# Patient Record
Sex: FEMALE | Race: WHITE | Employment: OTHER | URBAN - METROPOLITAN AREA
[De-identification: names, ages, dates, MRNs, and addresses within clinical notes are randomized per-mention and may not be internally consistent; named-entity substitution may affect disease eponyms.]

---

## 2019-10-10 ENCOUNTER — TRANSFERRED RECORDS (OUTPATIENT)
Dept: HEALTH INFORMATION MANAGEMENT | Facility: CLINIC | Age: 69
End: 2019-10-10

## 2019-10-14 ENCOUNTER — TRANSFERRED RECORDS (OUTPATIENT)
Dept: HEALTH INFORMATION MANAGEMENT | Facility: CLINIC | Age: 69
End: 2019-10-14

## 2020-01-13 ENCOUNTER — TRANSFERRED RECORDS (OUTPATIENT)
Dept: HEALTH INFORMATION MANAGEMENT | Facility: CLINIC | Age: 70
End: 2020-01-13

## 2020-02-25 ENCOUNTER — OFFICE VISIT (OUTPATIENT)
Dept: OBGYN | Facility: CLINIC | Age: 70
End: 2020-02-25
Attending: NURSE PRACTITIONER
Payer: COMMERCIAL

## 2020-02-25 ENCOUNTER — APPOINTMENT (OUTPATIENT)
Dept: LAB | Facility: CLINIC | Age: 70
End: 2020-02-25
Attending: NURSE PRACTITIONER
Payer: COMMERCIAL

## 2020-02-25 VITALS
DIASTOLIC BLOOD PRESSURE: 83 MMHG | WEIGHT: 117.2 LBS | SYSTOLIC BLOOD PRESSURE: 139 MMHG | BODY MASS INDEX: 20.01 KG/M2 | HEART RATE: 106 BPM | HEIGHT: 64 IN

## 2020-02-25 DIAGNOSIS — R06.83 SNORING: ICD-10-CM

## 2020-02-25 DIAGNOSIS — R79.89 ELEVATED PARATHYROID HORMONE: ICD-10-CM

## 2020-02-25 DIAGNOSIS — Z12.4 SCREENING FOR CERVICAL CANCER: ICD-10-CM

## 2020-02-25 DIAGNOSIS — R40.0 DAYTIME SLEEPINESS: ICD-10-CM

## 2020-02-25 DIAGNOSIS — Z76.89 ENCOUNTER TO ESTABLISH CARE: ICD-10-CM

## 2020-02-25 DIAGNOSIS — A63.0 GENITAL WARTS: ICD-10-CM

## 2020-02-25 DIAGNOSIS — Z00.00 ENCOUNTER FOR MEDICAL EXAMINATION TO ESTABLISH CARE: Primary | ICD-10-CM

## 2020-02-25 DIAGNOSIS — M85.80 OSTEOPENIA, UNSPECIFIED LOCATION: ICD-10-CM

## 2020-02-25 DIAGNOSIS — Z12.11 SPECIAL SCREENING FOR MALIGNANT NEOPLASMS, COLON: ICD-10-CM

## 2020-02-25 PROCEDURE — G0463 HOSPITAL OUTPT CLINIC VISIT: HCPCS

## 2020-02-25 PROCEDURE — G0476 HPV COMBO ASSAY CA SCREEN: HCPCS | Performed by: NURSE PRACTITIONER

## 2020-02-25 PROCEDURE — G0145 SCR C/V CYTO,THINLAYER,RESCR: HCPCS | Performed by: NURSE PRACTITIONER

## 2020-02-25 PROCEDURE — 87624 HPV HI-RISK TYP POOLED RSLT: CPT | Performed by: NURSE PRACTITIONER

## 2020-02-25 RX ORDER — ROSUVASTATIN CALCIUM 5 MG/1
5 TABLET, COATED ORAL DAILY
COMMUNITY
Start: 2020-02-07 | End: 2020-05-06

## 2020-02-25 RX ORDER — IMIQUIMOD 12.5 MG/.25G
CREAM TOPICAL
Qty: 8 PACKET | Refills: 3 | Status: SHIPPED | OUTPATIENT
Start: 2020-02-25 | End: 2021-08-04

## 2020-02-25 RX ORDER — METHYLPHENIDATE HYDROCHLORIDE 18 MG/1
TABLET ORAL 2 TIMES DAILY
COMMUNITY
Start: 2020-02-09

## 2020-02-25 RX ORDER — DESIPRAMINE HYDROCHLORIDE 50 MG/1
25 TABLET ORAL DAILY
COMMUNITY
Start: 2019-10-14 | End: 2021-11-01

## 2020-02-25 RX ORDER — METHYLPHENIDATE HYDROCHLORIDE 10 MG/1
TABLET ORAL
COMMUNITY
Start: 2020-02-07

## 2020-02-25 SDOH — HEALTH STABILITY: MENTAL HEALTH: HOW OFTEN DO YOU HAVE A DRINK CONTAINING ALCOHOL?: MONTHLY OR LESS

## 2020-02-25 ASSESSMENT — MIFFLIN-ST. JEOR: SCORE: 1028.68

## 2020-02-25 ASSESSMENT — PAIN SCALES - GENERAL: PAINLEVEL: NO PAIN (0)

## 2020-02-25 NOTE — LETTER
2/25/2020       RE: Nubia Lee  [address] [facility]  [place] [address]     Dear Colleague,    Thank you for referring your patient, Nubia Lee, to the [facility] at [facility]. Please see a copy of my visit note below.    Nubia Lee is a 70 yr old female who presents to clinic today to establish care for preventative health. She is not currently under the care of any healthcare providers. She had been seen at [facility] 1/13/2020 for problem-focused visit and had a physical exam in Fall 2019, and they recommended she establish care with a primary care provider for pap smear.     1. Breast cancer in 2000 of right breast s/p lumpectomy with axillary lump node dissection, chemo and radiation  - has been getting mammograms yearly at [facility], last in August 2019. Did have a breast ultrasound and mammogram with tomosynthesis 10/14/2019 due to right breast lump. Result: BI-RADS 2 benign. Plan to continue yearly mammograms.     2. Sleep apnea diagnosed at [facility] in Fall 2019. They recommended that she have this evaluated further by sleep clinic. Snoring, notices apnic spells that wake her, and feels tired during the day.    3. ADHD: Behavioral health services in [place], prescribes Concerta for ADHD and trintelix for depression.  Feels mental health is stable and in a good place now.      4. Preventative Health Screenings:  - Bone mineral density - see below  - Pap history - see below   - Colonoscopy: last done in 2005, normal; due now; open to colonoscopy  - Fasting glucose: WNL 10/10/2019   - Hepatitis C screening: negative 10/10/2019  - Lipids: WNL 1/13/2020  - TSH: WNL 10/10/2019    5. Elevated Parathyroid hormone & Calcium: Elevated in Fall 2019 at [place]. Has symptoms of fatigue and achy bones.     6. Hx of cervical HPV: Had colposcopy in 1988 due to abnormal cells. Last pap smear 2013 which was normal. Prior to 2013,  pap smears were sporadic.      7. Hx of Osteopenia: Osteoporosis diagnosed 10/2004 by DEXA scan.  Her lowest T-score was negative 3.7.Her most recent DEXA 10/2008 shows moderate osteopenia with lowest T score -1.9 in the R hip. Takes calcium and vitamin D supplement.     8. Hx of migraines: Takes exedrin migraine PRN for relief    Social History:     of prostate cancer which metastasized in 2017.  Delivered twins after IVF at age 49.  Soon after that breast cancer was diagnosed. She has a daughter with CP and her twin was diagnosed with leukemia at age 2, now doing very well. Daughters are now 20 yrs old. Daughter with CP lives with her. 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     Menopause: early 50's; no vaginal bleeding since that time. Occasional hot flashes, not bothersome. Has vaginal dryness, but not bothersome.     Sexual Health: not currently sexually active; no concerns today      Immunizations:  - Declined Flu vaccine  - Has not completed Shingrix vaccines     Past Medical History:   Diagnosis Date     Breast cancer (H)      Ectopic pregnancy      Osteopenia      Palpitations      Past Surgical History:   Procedure Laterality Date     LUMPECTOMY BREAST Right      History reviewed. No pertinent family history.  Current Outpatient Medications   Medication     AMINO ACIDS COMPLEX PO     aspirin-acetaminophen-caffeine (EXCEDRIN MIGRAINE) 250-250-65 MG tablet     Calcium Carb-Cholecalciferol (CALCIUM-VITAMIN D3) 500-400 MG-UNIT TABS     cholecalciferol (VITAMIN D) 200 units (5 mcg) TABS half-tab     desipramine (NORPRAMIN) 50 MG tablet     imiquimod (ALDARA) 5 % external cream     Magnesium Oxide 140 MG CAPS     methylphenidate (RITALIN) 10 MG tablet     methylphenidate HCl ER (CONCERTA) 18 MG CR tablet     Multiple Vitamins-Minerals (MULTIVITAMIN ADULT EXTRA C PO)     rosuvastatin (CRESTOR) 5 MG tablet     Ubiquinol 100 MG CAPS     vortioxetine (TRINTELLIX) 10 MG tablet     No  "current facility-administered medications for this visit.       No Known Allergies      OBJECTIVE:  /83   Pulse 106   Ht 1.613 m (5' 3.5\")   Wt 53.2 kg (117 lb 3.2 oz)   Breastfeeding No   BMI 20.44 kg/m    General: pleasant female in no acute distress  Psych: normal mentation, well oriented  Musculoskeletal: no gross deformities  Pelvic Exam:  Vulva: No external lesions, normal hair distribution, no adenopathy; genital warts present  Vagina: estrogen loss atrophy present; no abnormal discharge, minimal rugae, no lesions  Cervix: smooth, pink, no visible lesions; no CMT  Uterus: Normal size, anteverted, non-tender, mobile  Ovaries: No mass, non-tender, mobile  Rectal exam: normal anus    ASSESSMENT & PLAN:  (Z12.11) Special screening for malignant neoplasms, colon  (primary encounter diagnosis)  Plan: COLORECTAL SURGERY REFERRAL       Pt to schedule colonosocpy    (M85.80) Osteopenia, unspecified location  Plan: Dexa hip/pelvis/spine        Pt to schedule Dexa scan and then a follow-up visit with internal medicine to discuss results.      (E34.9) Elevated parathyroid hormone  Plan: Calcium, Parathyroid Hormone Intact,         Comprehensive metabolic panel (BMP + Alb, Alk         Phos, ALT, AST, Total. Bili, TP)    (R06.83) Snoring  Plan: SLEEP EVALUATION & MANAGEMENT REFERRAL - [facility]         [phone] (Age 15 and up)    (R40.0) Daytime sleepiness  Plan: SLEEP EVALUATION & MANAGEMENT REFERRAL - [facility]         [phone] (Age 15 and up)    (Z00.00) Encounter for medical examination to establish care  - Next mammogram due 9/2020   - Recommended Shingrix and flu vaccines    (Z76.89) Encounter to establish care  Plan: Comprehensive metabolic panel (BMP + Alb, Alk         Phos, ALT, AST, Total. Bili, TP)          (Z12.4) Screening for cervical cancer  Plan: Pap imaged thin layer screen with HPV -         recommended " age 30 - 65 years (select HPV order        below), HPV High Risk Types DNA Cervical    (A63.0) Genital warts  Plan: imiquimod (ALDARA) 5 % external cream:  Apply a thin layer 3 times per week prior to bedtime; leave on skin for 6 to 10 hours, then remove with mild soap and water. Continue until there is cleared or for a max of 16 weeks.    Recommended patient complete these referrals and labs and then schedule follow-up appointment with Dr. Chambers, internal medicine MD at [facility] at that time.    Nubia expressed understanding and agreement with the plan for care.    A total of 45 minutes was spent in direct contact with the patient and > 50% of the time in patient education and coordination of care.    Lindsey Romero, TRACY, APRN, WHNP                Again, thank you for allowing me to participate in the care of your patient.      Sincerely,    Lindsey Romero, AREN CNP

## 2020-02-25 NOTE — PATIENT INSTRUCTIONS
Schedule:  - Mammogram at [facility] after 8/2020  - Colonoscopy - they will call you to schedule this  - Sleep Center Evaluation - [phone]   - Bone Density Scan will be done at the:    [facility]   [address]  [address] [place] [address]   Appointments: [phone]       Pap smear was done today    Go to the lab to recheck your parathyroid hormone today.     You will be notified of lab results when available. Would recommend follow-up with Dr. Chambers, internal medicine MD, within [facility] at that time.

## 2020-02-25 NOTE — PROGRESS NOTES
Nubia Lee is a 70 yr old female who presents to clinic today to establish care for preventative health. She is not currently under the care of any healthcare providers. She had been seen at [facility] 1/13/2020 for problem-focused visit and had a physical exam in Fall 2019, and they recommended she establish care with a primary care provider for pap smear.     1. Breast cancer in 2000 of right breast s/p lumpectomy with axillary lump node dissection, chemo and radiation  - has been getting mammograms yearly at [facility], last in August 2019. Did have a breast ultrasound and mammogram with tomosynthesis 10/14/2019 due to right breast lump. Result: BI-RADS 2 benign. Plan to continue yearly mammograms.     2. Sleep apnea diagnosed at [facility] in Fall 2019. They recommended that she have this evaluated further by sleep clinic. Snoring, notices apnic spells that wake her, and feels tired during the day.    3. ADHD: Behavioral health services in [place], prescribes Concerta for ADHD and trintelix for depression.  Feels mental health is stable and in a good place now.     PHQ-9 score today: 2  DEMETRIO-7 score today: 0  HARK score: 0  Feels safe.      4. Preventative Health Screenings:  - Bone mineral density - see below  - Pap history - see below   - Colonoscopy: last done in 2005, normal; due now; open to colonoscopy  - Fasting glucose: WNL 10/10/2019   - Hepatitis C screening: negative 10/10/2019  - Lipids: WNL 1/13/2020, currently taking Crestor 5mg daily   - TSH: WNL 10/10/2019    5. Elevated Parathyroid hormone & Calcium: Elevated in Fall 2019 at [place]. Has symptoms of fatigue and achy bones.     6. Hx of cervical HPV: Had colposcopy in 1988 due to abnormal cells. Last pap smear 2013 which was normal. Prior to 2013, pap smears were sporadic.      7. Hx of Osteopenia: Osteoporosis diagnosed 10/2004 by DEXA scan.  Her lowest T-score was negative 3.7.Her most recent DEXA 10/2008 shows moderate  "osteopenia with lowest T score -1.9 in the R hip. Takes calcium and vitamin D supplement.     8. Hx of migraines: Takes exedrin migraine PRN for relief    Social History:     of prostate cancer which metastasized in 2017.  Delivered twins after IVF at age 49.  Soon after that breast cancer was diagnosed. She has a daughter with CP and her twin was diagnosed with leukemia at age 2, now doing very well. Daughters are now 20 yrs old. Daughter with CP lives with her. 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     Menopause: early 50's; no vaginal bleeding since that time. Occasional hot flashes, not bothersome. Has vaginal dryness, but not bothersome.     Sexual Health: not currently sexually active; no concerns today      Immunizations:  - Declined Flu vaccine  - Has not completed Shingrix vaccines     Past Medical History:   Diagnosis Date     Breast cancer (H)      Ectopic pregnancy      Osteopenia      Palpitations      Past Surgical History:   Procedure Laterality Date     LUMPECTOMY BREAST Right      History reviewed. No pertinent family history.  Current Outpatient Medications   Medication     AMINO ACIDS COMPLEX PO     aspirin-acetaminophen-caffeine (EXCEDRIN MIGRAINE) 250-250-65 MG tablet     Calcium Carb-Cholecalciferol (CALCIUM-VITAMIN D3) 500-400 MG-UNIT TABS     cholecalciferol (VITAMIN D) 200 units (5 mcg) TABS half-tab     desipramine (NORPRAMIN) 50 MG tablet     imiquimod (ALDARA) 5 % external cream     Magnesium Oxide 140 MG CAPS     methylphenidate (RITALIN) 10 MG tablet     methylphenidate HCl ER (CONCERTA) 18 MG CR tablet     Multiple Vitamins-Minerals (MULTIVITAMIN ADULT EXTRA C PO)     rosuvastatin (CRESTOR) 5 MG tablet     Ubiquinol 100 MG CAPS     vortioxetine (TRINTELLIX) 10 MG tablet     No current facility-administered medications for this visit.       No Known Allergies      OBJECTIVE:  /83   Pulse 106   Ht 1.613 m (5' 3.5\")   Wt 53.2 kg (117 lb 3.2 oz)   " Breastfeeding No   BMI 20.44 kg/m    General: pleasant female in no acute distress  Psych: normal mentation, well oriented  Musculoskeletal: no gross deformities  Pelvic Exam:  Vulva: No external lesions, normal hair distribution, no adenopathy; genital warts present  Vagina: estrogen loss atrophy present; no abnormal discharge, minimal rugae, no lesions  Cervix: smooth, pink, no visible lesions; no CMT  Uterus: Normal size, anteverted, non-tender, mobile  Ovaries: No mass, non-tender, mobile  Rectal exam: normal anus    ASSESSMENT & PLAN:  (Z12.11) Special screening for malignant neoplasms, colon  (primary encounter diagnosis)  Plan: COLORECTAL SURGERY REFERRAL       Pt to schedule colonosocpy    (M85.80) Osteopenia, unspecified location  Plan: Dexa hip/pelvis/spine        Pt to schedule Dexa scan and then a follow-up visit with internal medicine to discuss results.      (E34.9) Elevated parathyroid hormone  Plan: Calcium, Parathyroid Hormone Intact,         Comprehensive metabolic panel (BMP + Alb, Alk         Phos, ALT, AST, Total. Bili, TP)    (R06.83) Snoring  Plan: SLEEP EVALUATION & MANAGEMENT REFERRAL - [facility]         [phone] (Age 15 and up)    (R40.0) Daytime sleepiness  Plan: SLEEP EVALUATION & MANAGEMENT REFERRAL - [facility]         [phone] (Age 15 and up)    (Z00.00) Encounter for medical examination to establish care  - Next mammogram due 9/2020   - Recommended Shingrix and flu vaccines    (Z76.89) Encounter to establish care  Plan: Comprehensive metabolic panel (BMP + Alb, Alk         Phos, ALT, AST, Total. Bili, TP)          (Z12.4) Screening for cervical cancer  Plan: Pap imaged thin layer screen with HPV -         recommended age 30 - 65 years (select HPV order        below), HPV High Risk Types DNA Cervical    (A63.0) Genital warts  Plan: imiquimod (ALDARA) 5 % external cream:  Apply a thin layer 3  times per week prior to bedtime; leave on skin for 6 to 10 hours, then remove with mild soap and water. Continue until there is cleared or for a max of 16 weeks.    Recommended patient complete these referrals and labs and then schedule follow-up appointment with Dr. Chambers, internal medicine MD at [facility] at that time.    Nubia expressed understanding and agreement with the plan for care.    A total of 45 minutes was spent in direct contact with the patient and > 50% of the time in patient education and coordination of care.    Lindsey Romero, DNP, APRN, WHNP

## 2020-02-28 LAB
COPATH REPORT: NORMAL
PAP: NORMAL

## 2020-03-02 LAB
FINAL DIAGNOSIS: NORMAL
HPV HR 12 DNA CVX QL NAA+PROBE: NEGATIVE
HPV16 DNA SPEC QL NAA+PROBE: NEGATIVE
HPV18 DNA SPEC QL NAA+PROBE: NEGATIVE
SPECIMEN DESCRIPTION: NORMAL
SPECIMEN SOURCE CVX/VAG CYTO: NORMAL

## 2020-03-21 PROBLEM — I83.892 VARICOSE VEINS OF LEFT LOWER EXTREMITY WITH OTHER COMPLICATIONS: Status: ACTIVE | Noted: 2019-10-30

## 2020-03-21 PROBLEM — E67.3 HYPERVITAMINOSIS D: Status: ACTIVE | Noted: 2019-10-30

## 2020-03-21 PROBLEM — A63.0 GENITAL WARTS: Status: ACTIVE | Noted: 2020-03-21

## 2020-03-21 PROBLEM — R94.2 ABNORMAL RESULTS OF PULMONARY FUNCTION STUDIES: Status: ACTIVE | Noted: 2019-10-30

## 2020-03-21 PROBLEM — I70.209 ATHEROSCLEROSIS OF ARTERIES OF EXTREMITIES (H): Status: ACTIVE | Noted: 2019-10-30

## 2020-03-21 PROBLEM — R79.89 ELEVATED PARATHYROID HORMONE: Status: ACTIVE | Noted: 2020-03-21

## 2020-03-21 PROBLEM — Z87.891 PERSONAL HISTORY OF NICOTINE DEPENDENCE: Status: ACTIVE | Noted: 2019-10-30

## 2020-03-21 PROBLEM — E78.5 HYPERLIPIDEMIA: Status: ACTIVE | Noted: 2019-10-30

## 2020-03-21 PROBLEM — E83.52 HYPERCALCEMIA: Status: ACTIVE | Noted: 2019-10-30

## 2020-03-21 PROBLEM — Z85.3 HISTORY OF MALIGNANT NEOPLASM OF BREAST: Status: ACTIVE | Noted: 2019-10-30

## 2020-03-21 PROBLEM — M85.80 OSTEOPENIA: Status: ACTIVE | Noted: 2019-10-30

## 2020-05-06 DIAGNOSIS — E78.01 FAMILIAL HYPERCHOLESTEROLEMIA: ICD-10-CM

## 2020-05-06 DIAGNOSIS — Z13.220 SCREENING FOR HYPERLIPIDEMIA: Primary | ICD-10-CM

## 2020-05-06 RX ORDER — ROSUVASTATIN CALCIUM 5 MG/1
5 TABLET, COATED ORAL DAILY
Qty: 90 TABLET | Refills: 3 | Status: SHIPPED | OUTPATIENT
Start: 2020-05-06 | End: 2021-06-16

## 2020-05-06 NOTE — TELEPHONE ENCOUNTER
Rosuvastatin is noted as historical.  Will require MD approval.  Forwarded to Dr. Chambers to review    Appt is scheduled for July 2020 with Dr. Chambers.

## 2020-05-06 NOTE — TELEPHONE ENCOUNTER
----- Message from Michelle Harding sent at 5/6/2020 11:18 AM CDT -----  Regarding: Medication Refill - rosuvastatin (CRESTOR) 5 MG tablet  Hello!  Nubia calling to request a refill of her medication rosuvastatin (CRESTOR) 5 MG tablet.  She would greatly appreciate if she could have enough to last until her appointment with Dr. Chambers on 7/6/2020.  She uses the [facility] [address] [place] [address] [id number] Telephone :  [phone]  Fax:  [phone]     Thank you!  Michelel KRAUSE    Please DO NOT send this message and/or reply back to sender. Call Center Representatives DO NOT respond to messages.

## 2020-07-06 ENCOUNTER — VIRTUAL VISIT (OUTPATIENT)
Dept: INTERNAL MEDICINE | Facility: CLINIC | Age: 70
End: 2020-07-06
Attending: INTERNAL MEDICINE
Payer: COMMERCIAL

## 2020-07-06 DIAGNOSIS — Z23 NEED FOR SHINGLES VACCINE: ICD-10-CM

## 2020-07-06 DIAGNOSIS — E83.52 HYPERCALCEMIA: Primary | ICD-10-CM

## 2020-07-06 DIAGNOSIS — Z12.11 COLON CANCER SCREENING: ICD-10-CM

## 2020-07-06 NOTE — LETTER
"7/6/2020       RE: Nubia Lee  [address] [place] [address]     Dear Colleague,    Thank you for referring your patient, Nubia Lee, to the [facility]  at [facility]. Please see a copy of my visit note below.    Nubia Lee is a 70 year old female who is being evaluated via a billable telephone visit.      The patient has been notified of following:     \"This telephone visit will be conducted via a call between you and your physician/provider. We have found that certain health care needs can be provided without the need for a physical exam.  This service lets us provide the care you need with a short phone conversation.  If a prescription is necessary we can send it directly to your pharmacy.  If lab work is needed we can place an order for that and you can then stop by our lab to have the test done at a later time.    Telephone visits are billed at different rates depending on your insurance coverage. During this emergency period, for some insurers they may be billed the same as an in-person visit.  Please reach out to your insurance provider with any questions.    If during the course of the call the physician/provider feels a telephone visit is not appropriate, you will not be charged for this service.\"    Patient has given verbal consent for Telephone visit?  Yes    What phone number would you like to be contacted at? [phone]     How would you like to obtain your AVS? Mail a copy    Subjective     Nubia Lee is a 70 year old female who presents via phone visit today for the following health issues:    HPI  New Patient/Transfer of Care  Patient reports that she was evaluated for hyperparathyroidism recently. She had elevated calcium level at [facility]. She was seen in February. She has not had blood work done since that time.   Patient reports that she has some discomfort in her throat that comes and goes. " She went to [facility] for evaluation of fatigue. She was found to have slow heart rate. She states that she not had any blood work since February due to coronavirus.     -------------------------------------    Patient Active Problem List   Diagnosis     Personal history of nicotine dependence     Osteopenia     Hypervitaminosis D     Hyperlipidemia     Varicose veins of left lower extremity with other complications     History of malignant neoplasm of breast     Hypercalcemia     Atherosclerosis of arteries of extremities (H)     Abnormal results of pulmonary function studies     Genital warts     Elevated parathyroid hormone     Past Surgical History:   Procedure Laterality Date     LUMPECTOMY BREAST Right        Social History     Tobacco Use     Smoking status: Former Smoker     Last attempt to quit:      Years since quittin.5     Smokeless tobacco: Never Used   Substance Use Topics     Alcohol use: Yes     Frequency: Monthly or less     No family history on file.      Current Outpatient Medications   Medication Sig Dispense Refill     AMINO ACIDS COMPLEX PO Take 1 mL by mouth       aspirin-acetaminophen-caffeine (EXCEDRIN MIGRAINE) 250-250-65 MG tablet Take 1 tablet by mouth       Calcium Carb-Cholecalciferol (CALCIUM-VITAMIN D3) 500-400 MG-UNIT TABS        cholecalciferol (VITAMIN D) 200 units (5 mcg) TABS half-tab Take 50 mcg by mouth       desipramine (NORPRAMIN) 50 MG tablet Take 25 mg by mouth daily       imiquimod (ALDARA) 5 % external cream Apply a thin layer 3 times per week prior to bedtime; leave on skin for 6 to 10 hours, then remove with mild soap and water. Continue until there is cleared or for a max of 16 weeks. 8 packet 3     Magnesium Oxide 140 MG CAPS Take 400 mg by mouth       methylphenidate (RITALIN) 10 MG tablet TAKE 1 TO 2 TABLETS BY MOUTH ONCE DAILY       methylphenidate HCl ER (CONCERTA) 18 MG CR tablet TAKE 2 TABLETS BY MOUTH IN THE MORNING       Multiple Vitamins-Minerals  (MULTIVITAMIN ADULT EXTRA C PO) Take 2 tablets by mouth       rosuvastatin (CRESTOR) 5 MG tablet Take 1 tablet (5 mg) by mouth daily 90 tablet 3     Ubiquinol 100 MG CAPS Take 100 mg by mouth       vortioxetine (TRINTELLIX) 10 MG tablet Take 10 mg by mouth daily         Reviewed and updated as needed this visit by Provider         Review of Systems   CONSTITUTIONAL: NEGATIVE for fever, chills, change in weight  INTEGUMENTARY/SKIN: NEGATIVE for worrisome rashes, moles or lesions  EYES: NEGATIVE for vision changes or irritation  ENT/MOUTH: intermittent neck pain  RESP: NEGATIVE for significant cough or SOB  CV: NEGATIVE for chest pain, palpitations or peripheral edema  GI: NEGATIVE for nausea, abdominal pain, heartburn, or change in bowel habits  : NEGATIVE for frequency, dysuria, or hematuria  MUSCULOSKELETAL: NEGATIVE for significant arthralgias or myalgia  NEURO: NEGATIVE for weakness, dizziness or paresthesias  ENDOCRINE: NEGATIVE for temperature intolerance, skin/hair changes  HEME: NEGATIVE for bleeding problems  PSYCHIATRIC: NEGATIVE for changes in mood or affect     Past Medical History:   Diagnosis Date     Breast cancer (H)      Ectopic pregnancy      Osteopenia      Palpitations      Past Surgical History:   Procedure Laterality Date     LUMPECTOMY BREAST Right      No family history on file.  Social History     Socioeconomic History     Marital status:      Spouse name: Not on file     Number of children: Not on file     Years of education: Not on file     Highest education level: Not on file   Occupational History     Not on file   Social Needs     Financial resource strain: Not on file     Food insecurity     Worry: Not on file     Inability: Not on file     Transportation needs     Medical: Not on file     Non-medical: Not on file   Tobacco Use     Smoking status: Former Smoker     Last attempt to quit:      Years since quittin.5     Smokeless tobacco: Never Used   Substance and Sexual  Activity     Alcohol use: Yes     Frequency: Monthly or less     Drug use: Never     Sexual activity: Not Currently     Partners: Male   Lifestyle     Physical activity     Days per week: Not on file     Minutes per session: Not on file     Stress: Not on file   Relationships     Social connections     Talks on phone: Not on file     Gets together: Not on file     Attends Sikh service: Not on file     Active member of club or organization: Not on file     Attends meetings of clubs or organizations: Not on file     Relationship status: Not on file     Intimate partner violence     Fear of current or ex partner: Not on file     Emotionally abused: Not on file     Physically abused: Not on file     Forced sexual activity: Not on file   Other Topics Concern     Not on file   Social History Narrative     Not on file     Objective   Reported vitals:  There were no vitals taken for this visit.   healthy, alert and no distress  PSYCH: Alert and oriented times 3; coherent speech, normal   rate and volume, able to articulate logical thoughts, able   to abstract reason, no tangential thoughts, no hallucinations   or delusions  Her affect is normal  RESP: No cough, no audible wheezing, able to talk in full sentences  Remainder of exam unable to be completed due to telephone visits    Diagnostic Test Results:  Labs reviewed in Epic      Assessment/Plan:  1. Hypercalcemia  Discussed causes of elevated calcium and PTH. Recommend additional blood and urine testing. Patient will complete the tests at her convenience. SHe will be advised on test results accordingly.   - Comprehensive metabolic panel; Future  - Parathyroid Hormone Intact; Future  - 25 OH Vit D therapy monitoring; Future  - Phosphorus; Future  - Protein Electrophoresis; Future  - Protein electrophoresis random urine; Future    2. Colon cancer screening  Reviewed options for colon cancer screening. Patient opted for FIT test. Will need colonoscopy if the test is  positive.   - Fecal colorectal cancer screen FIT; Future    3. Need for shingles vaccine    - ZOSTER VACCINE RECOMBINANT ADJUVANTED IM NJX; Standing    No follow-ups on file.  Phone call duration:  21 minutes    Cassandra Chambers MD

## 2020-07-06 NOTE — PROGRESS NOTES
"Nubia Lee is a 70 year old female who is being evaluated via a billable telephone visit.      The patient has been notified of following:     \"This telephone visit will be conducted via a call between you and your physician/provider. We have found that certain health care needs can be provided without the need for a physical exam.  This service lets us provide the care you need with a short phone conversation.  If a prescription is necessary we can send it directly to your pharmacy.  If lab work is needed we can place an order for that and you can then stop by our lab to have the test done at a later time.    Telephone visits are billed at different rates depending on your insurance coverage. During this emergency period, for some insurers they may be billed the same as an in-person visit.  Please reach out to your insurance provider with any questions.    If during the course of the call the physician/provider feels a telephone visit is not appropriate, you will not be charged for this service.\"    Patient has given verbal consent for Telephone visit?  Yes    What phone number would you like to be contacted at? [phone]     How would you like to obtain your AVS? Mail a copy    Subjective     Nubia Lee is a 70 year old female who presents via phone visit today for the following health issues:    HPI  New Patient/Transfer of Care  Patient reports that she was evaluated for hyperparathyroidism recently. She had elevated calcium level at [facility]. She was seen in February. She has not had blood work done since that time.   Patient reports that she has some discomfort in her throat that comes and goes. She went to [facility] for evaluation of fatigue. She was found to have slow heart rate. She states that she not had any blood work since February due to coronavirus.     -------------------------------------    Patient Active Problem List   Diagnosis     Personal history of nicotine dependence     " Osteopenia     Hypervitaminosis D     Hyperlipidemia     Varicose veins of left lower extremity with other complications     History of malignant neoplasm of breast     Hypercalcemia     Atherosclerosis of arteries of extremities (H)     Abnormal results of pulmonary function studies     Genital warts     Elevated parathyroid hormone     Past Surgical History:   Procedure Laterality Date     LUMPECTOMY BREAST Right        Social History     Tobacco Use     Smoking status: Former Smoker     Last attempt to quit:      Years since quittin.5     Smokeless tobacco: Never Used   Substance Use Topics     Alcohol use: Yes     Frequency: Monthly or less     No family history on file.      Current Outpatient Medications   Medication Sig Dispense Refill     AMINO ACIDS COMPLEX PO Take 1 mL by mouth       aspirin-acetaminophen-caffeine (EXCEDRIN MIGRAINE) 250-250-65 MG tablet Take 1 tablet by mouth       Calcium Carb-Cholecalciferol (CALCIUM-VITAMIN D3) 500-400 MG-UNIT TABS        cholecalciferol (VITAMIN D) 200 units (5 mcg) TABS half-tab Take 50 mcg by mouth       desipramine (NORPRAMIN) 50 MG tablet Take 25 mg by mouth daily       imiquimod (ALDARA) 5 % external cream Apply a thin layer 3 times per week prior to bedtime; leave on skin for 6 to 10 hours, then remove with mild soap and water. Continue until there is cleared or for a max of 16 weeks. 8 packet 3     Magnesium Oxide 140 MG CAPS Take 400 mg by mouth       methylphenidate (RITALIN) 10 MG tablet TAKE 1 TO 2 TABLETS BY MOUTH ONCE DAILY       methylphenidate HCl ER (CONCERTA) 18 MG CR tablet TAKE 2 TABLETS BY MOUTH IN THE MORNING       Multiple Vitamins-Minerals (MULTIVITAMIN ADULT EXTRA C PO) Take 2 tablets by mouth       rosuvastatin (CRESTOR) 5 MG tablet Take 1 tablet (5 mg) by mouth daily 90 tablet 3     Ubiquinol 100 MG CAPS Take 100 mg by mouth       vortioxetine (TRINTELLIX) 10 MG tablet Take 10 mg by mouth daily         Reviewed and updated as needed  this visit by Provider         Review of Systems   CONSTITUTIONAL: NEGATIVE for fever, chills, change in weight  INTEGUMENTARY/SKIN: NEGATIVE for worrisome rashes, moles or lesions  EYES: NEGATIVE for vision changes or irritation  ENT/MOUTH: intermittent neck pain  RESP: NEGATIVE for significant cough or SOB  CV: NEGATIVE for chest pain, palpitations or peripheral edema  GI: NEGATIVE for nausea, abdominal pain, heartburn, or change in bowel habits  : NEGATIVE for frequency, dysuria, or hematuria  MUSCULOSKELETAL: NEGATIVE for significant arthralgias or myalgia  NEURO: NEGATIVE for weakness, dizziness or paresthesias  ENDOCRINE: NEGATIVE for temperature intolerance, skin/hair changes  HEME: NEGATIVE for bleeding problems  PSYCHIATRIC: NEGATIVE for changes in mood or affect     Past Medical History:   Diagnosis Date     Breast cancer (H)      Ectopic pregnancy      Osteopenia      Palpitations      Past Surgical History:   Procedure Laterality Date     LUMPECTOMY BREAST Right      No family history on file.  Social History     Socioeconomic History     Marital status:      Spouse name: Not on file     Number of children: Not on file     Years of education: Not on file     Highest education level: Not on file   Occupational History     Not on file   Social Needs     Financial resource strain: Not on file     Food insecurity     Worry: Not on file     Inability: Not on file     Transportation needs     Medical: Not on file     Non-medical: Not on file   Tobacco Use     Smoking status: Former Smoker     Last attempt to quit:      Years since quittin.5     Smokeless tobacco: Never Used   Substance and Sexual Activity     Alcohol use: Yes     Frequency: Monthly or less     Drug use: Never     Sexual activity: Not Currently     Partners: Male   Lifestyle     Physical activity     Days per week: Not on file     Minutes per session: Not on file     Stress: Not on file   Relationships     Social connections      Talks on phone: Not on file     Gets together: Not on file     Attends Oriental orthodox service: Not on file     Active member of club or organization: Not on file     Attends meetings of clubs or organizations: Not on file     Relationship status: Not on file     Intimate partner violence     Fear of current or ex partner: Not on file     Emotionally abused: Not on file     Physically abused: Not on file     Forced sexual activity: Not on file   Other Topics Concern     Not on file   Social History Narrative     Not on file         Objective   Reported vitals:  There were no vitals taken for this visit.   healthy, alert and no distress  PSYCH: Alert and oriented times 3; coherent speech, normal   rate and volume, able to articulate logical thoughts, able   to abstract reason, no tangential thoughts, no hallucinations   or delusions  Her affect is normal  RESP: No cough, no audible wheezing, able to talk in full sentences  Remainder of exam unable to be completed due to telephone visits    Diagnostic Test Results:  Labs reviewed in Epic        Assessment/Plan:  1. Hypercalcemia  Discussed causes of elevated calcium and PTH. Recommend additional blood and urine testing. Patient will complete the tests at her convenience. SHe will be advised on test results accordingly.   - Comprehensive metabolic panel; Future  - Parathyroid Hormone Intact; Future  - 25 OH Vit D therapy monitoring; Future  - Phosphorus; Future  - Protein Electrophoresis; Future  - Protein electrophoresis random urine; Future    2. Colon cancer screening  Reviewed options for colon cancer screening. Patient opted for FIT test. Will need colonoscopy if the test is positive.   - Fecal colorectal cancer screen FIT; Future    3. Need for shingles vaccine    - ZOSTER VACCINE RECOMBINANT ADJUVANTED IM NJX; Standing    No follow-ups on file.      Phone call duration:  21 minutes    Cassandra Chambers MD

## 2020-07-07 DIAGNOSIS — Z12.11 COLON CANCER SCREENING: ICD-10-CM

## 2020-07-07 PROCEDURE — 82274 ASSAY TEST FOR BLOOD FECAL: CPT | Performed by: INTERNAL MEDICINE

## 2020-07-08 DIAGNOSIS — Z12.11 COLON CANCER SCREENING: ICD-10-CM

## 2020-07-08 DIAGNOSIS — E83.52 HYPERCALCEMIA: ICD-10-CM

## 2020-07-08 LAB
ALBUMIN SERPL-MCNC: 3.7 G/DL (ref 3.4–5)
ALP SERPL-CCNC: 66 U/L (ref 40–150)
ALT SERPL W P-5'-P-CCNC: 37 U/L (ref 0–50)
ANION GAP SERPL CALCULATED.3IONS-SCNC: 6 MMOL/L (ref 3–14)
AST SERPL W P-5'-P-CCNC: 24 U/L (ref 0–45)
BILIRUB SERPL-MCNC: 0.7 MG/DL (ref 0.2–1.3)
BUN SERPL-MCNC: 29 MG/DL (ref 7–30)
CALCIUM SERPL-MCNC: 9.6 MG/DL (ref 8.5–10.1)
CHLORIDE SERPL-SCNC: 106 MMOL/L (ref 94–109)
CO2 SERPL-SCNC: 30 MMOL/L (ref 20–32)
CREAT SERPL-MCNC: 0.61 MG/DL (ref 0.52–1.04)
GFR SERPL CREATININE-BSD FRML MDRD: >90 ML/MIN/{1.73_M2}
GLUCOSE SERPL-MCNC: 84 MG/DL (ref 70–99)
PHOSPHATE SERPL-MCNC: 2.9 MG/DL (ref 2.5–4.5)
POTASSIUM SERPL-SCNC: 4.2 MMOL/L (ref 3.4–5.3)
PROT SERPL-MCNC: 7.4 G/DL (ref 6.8–8.8)
PTH-INTACT SERPL-MCNC: 61 PG/ML (ref 18–80)
SODIUM SERPL-SCNC: 142 MMOL/L (ref 133–144)

## 2020-07-08 PROCEDURE — 84100 ASSAY OF PHOSPHORUS: CPT | Performed by: INTERNAL MEDICINE

## 2020-07-08 PROCEDURE — 00000402 ZZHCL STATISTIC TOTAL PROTEIN: Performed by: INTERNAL MEDICINE

## 2020-07-08 PROCEDURE — 84165 PROTEIN E-PHORESIS SERUM: CPT | Performed by: INTERNAL MEDICINE

## 2020-07-08 PROCEDURE — 36415 COLL VENOUS BLD VENIPUNCTURE: CPT | Performed by: INTERNAL MEDICINE

## 2020-07-08 PROCEDURE — 83970 ASSAY OF PARATHORMONE: CPT | Performed by: INTERNAL MEDICINE

## 2020-07-08 PROCEDURE — 84166 PROTEIN E-PHORESIS/URINE/CSF: CPT | Performed by: INTERNAL MEDICINE

## 2020-07-08 PROCEDURE — 82306 VITAMIN D 25 HYDROXY: CPT | Performed by: INTERNAL MEDICINE

## 2020-07-08 PROCEDURE — 80053 COMPREHEN METABOLIC PANEL: CPT | Performed by: INTERNAL MEDICINE

## 2020-07-08 NOTE — LETTER
July 20, 2020      Nubia Lee  [address] [place] [address]        Dear ,    We are writing to inform you of your test results.    Your vitamin D level was elevated. Recommend stopping vitamin D supplementation and rechecking blood test in 2-3 months.     Resulted Orders   Protein electrophoresis random urine   Result Value Ref Range    ELP Interpretation Urine       Only trace albumin and trace globulins.  No obvious monoclonal proteins seen.    Pathological significance requires clinical correlation.  KRYSTYNA Dunaway M.D., Ph.D.,   Pathologist ()     Protein Electrophoresis   Result Value Ref Range    Albumin Fraction 4.2 3.7 - 5.1 g/dL    Alpha 1 Fraction 0.3 0.2 - 0.4 g/dL    Alpha 2 Fraction 0.8 0.5 - 0.9 g/dL    Beta Fraction 0.7 0.6 - 1.0 g/dL    Gamma Fraction 0.9 0.7 - 1.6 g/dL    Monoclonal Peak 0.0 0.0 g/dL    ELP Interpretation:       Essentially normal electrophoretic pattern.  No obvious monoclonal proteins seen.    Pathologic significance requires clinical correlation. KRYSTYNA Dunaway M.D., Ph.D.,   Pathologist ().     Phosphorus   Result Value Ref Range    Phosphorus 2.9 2.5 - 4.5 mg/dL   25 OH Vit D therapy monitoring   Result Value Ref Range    25 OH Vit D2 <5 ug/L    25 OH Vit D3 78 ug/L    25 OH Vit D total <83 (H) 20 - 75 ug/L      Comment:      Season, race, dietary intake, and treatment affect the concentration of   25-hydroxy-Vitamin D. Values may decrease during winter months and increase   during summer months. Values 20-29 ug/L may indicate Vitamin D insufficiency   and values <20 ug/L may indicate Vitamin D deficiency.  This test was developed and its performance characteristics determined by the   [facility].   It has not been cleared or approved by the FDA. The laboratory is regulated   under CLIA as qualified to perform high-complexity testing. This test is used   for  clinical purposes. It should not be regarded as investigational or for   research.     Parathyroid Hormone Intact   Result Value Ref Range    Parathyroid Hormone Intact 61 18 - 80 pg/mL   Comprehensive metabolic panel   Result Value Ref Range    Sodium 142 133 - 144 mmol/L    Potassium 4.2 3.4 - 5.3 mmol/L    Chloride 106 94 - 109 mmol/L    Carbon Dioxide 30 20 - 32 mmol/L    Anion Gap 6 3 - 14 mmol/L    Glucose 84 70 - 99 mg/dL    Urea Nitrogen 29 7 - 30 mg/dL    Creatinine 0.61 0.52 - 1.04 mg/dL    GFR Estimate >90 >60 mL/min/[1.73_m2]      Comment:      Non  GFR Calc  Starting 12/18/2018, serum creatinine based estimated GFR (eGFR) will be   calculated using the Chronic Kidney Disease Epidemiology Collaboration   (CKD-EPI) equation.      GFR Estimate If Black >90 >60 mL/min/[1.73_m2]      Comment:       GFR Calc  Starting 12/18/2018, serum creatinine based estimated GFR (eGFR) will be   calculated using the Chronic Kidney Disease Epidemiology Collaboration   (CKD-EPI) equation.      Calcium 9.6 8.5 - 10.1 mg/dL    Bilirubin Total 0.7 0.2 - 1.3 mg/dL    Albumin 3.7 3.4 - 5.0 g/dL    Protein Total 7.4 6.8 - 8.8 g/dL    Alkaline Phosphatase 66 40 - 150 U/L    ALT 37 0 - 50 U/L    AST 24 0 - 45 U/L       If you have any questions or concerns, please call the clinic at the number listed above.       Sincerely,  [facility]

## 2020-07-09 LAB
ALBUMIN SERPL ELPH-MCNC: 4.2 G/DL (ref 3.7–5.1)
ALPHA1 GLOB SERPL ELPH-MCNC: 0.3 G/DL (ref 0.2–0.4)
ALPHA2 GLOB SERPL ELPH-MCNC: 0.8 G/DL (ref 0.5–0.9)
B-GLOBULIN SERPL ELPH-MCNC: 0.7 G/DL (ref 0.6–1)
GAMMA GLOB SERPL ELPH-MCNC: 0.9 G/DL (ref 0.7–1.6)
M PROTEIN SERPL ELPH-MCNC: 0 G/DL
PROT PATTERN SERPL ELPH-IMP: NORMAL
PROT PATTERN UR ELPH-IMP: NORMAL

## 2020-07-10 LAB
DEPRECATED CALCIDIOL+CALCIFEROL SERPL-MC: <83 UG/L (ref 20–75)
VITAMIN D2 SERPL-MCNC: <5 UG/L
VITAMIN D3 SERPL-MCNC: 78 UG/L

## 2020-07-13 LAB — HEMOCCULT STL QL IA: NEGATIVE

## 2021-03-04 ENCOUNTER — IMMUNIZATION (OUTPATIENT)
Dept: PEDIATRICS | Facility: CLINIC | Age: 71
End: 2021-03-04
Payer: COMMERCIAL

## 2021-03-04 PROCEDURE — 91300 PR COVID VAC PFIZER DIL RECON 30 MCG/0.3 ML IM: CPT

## 2021-03-04 PROCEDURE — 0001A PR COVID VAC PFIZER DIL RECON 30 MCG/0.3 ML IM: CPT

## 2021-03-25 ENCOUNTER — IMMUNIZATION (OUTPATIENT)
Dept: PEDIATRICS | Facility: CLINIC | Age: 71
End: 2021-03-25
Attending: INTERNAL MEDICINE
Payer: COMMERCIAL

## 2021-03-25 PROCEDURE — 91300 PR COVID VAC PFIZER DIL RECON 30 MCG/0.3 ML IM: CPT

## 2021-03-25 PROCEDURE — 0002A PR COVID VAC PFIZER DIL RECON 30 MCG/0.3 ML IM: CPT

## 2021-04-03 ENCOUNTER — HEALTH MAINTENANCE LETTER (OUTPATIENT)
Age: 71
End: 2021-04-03

## 2021-05-25 ENCOUNTER — RECORDS - HEALTHEAST (OUTPATIENT)
Dept: ADMINISTRATIVE | Facility: CLINIC | Age: 71
End: 2021-05-25

## 2021-06-13 DIAGNOSIS — E78.01 FAMILIAL HYPERCHOLESTEROLEMIA: ICD-10-CM

## 2021-06-16 RX ORDER — ROSUVASTATIN CALCIUM 5 MG/1
5 TABLET, COATED ORAL DAILY
Qty: 30 TABLET | Refills: 0 | Status: SHIPPED | OUTPATIENT
Start: 2021-06-16 | End: 2021-06-25

## 2021-06-16 NOTE — TELEPHONE ENCOUNTER
Rosuvastatin Calcium 5 MG Oral Tablet  Last Written Prescription Date:  5/6/2020  Last Fill Quantity: 90,   # refills: 3  Last Office Visit : 7/6/2020  Future Office visit:  6/25/2021  30 Tabs, sent to pharm to cover Pt until office visit on June 25, 2021.        Kelsey Perez RN  Central Triage Red Flags/Med Refills

## 2021-06-25 ENCOUNTER — OFFICE VISIT (OUTPATIENT)
Dept: INTERNAL MEDICINE | Facility: CLINIC | Age: 71
End: 2021-06-25
Attending: INTERNAL MEDICINE
Payer: COMMERCIAL

## 2021-06-25 VITALS
WEIGHT: 121 LBS | BODY MASS INDEX: 20.66 KG/M2 | SYSTOLIC BLOOD PRESSURE: 134 MMHG | HEART RATE: 94 BPM | DIASTOLIC BLOOD PRESSURE: 73 MMHG | HEIGHT: 64 IN

## 2021-06-25 DIAGNOSIS — E78.01 FAMILIAL HYPERCHOLESTEROLEMIA: ICD-10-CM

## 2021-06-25 DIAGNOSIS — E83.52 HYPERCALCEMIA: Primary | ICD-10-CM

## 2021-06-25 DIAGNOSIS — R79.89 ELEVATED PARATHYROID HORMONE: ICD-10-CM

## 2021-06-25 DIAGNOSIS — R06.83 SNORING: ICD-10-CM

## 2021-06-25 DIAGNOSIS — Z23 NEED FOR PNEUMOCOCCAL VACCINE: ICD-10-CM

## 2021-06-25 DIAGNOSIS — I83.893 VARICOSE VEINS OF BOTH LEGS WITH EDEMA: ICD-10-CM

## 2021-06-25 DIAGNOSIS — Z78.0 POSTMENOPAUSAL STATUS: ICD-10-CM

## 2021-06-25 DIAGNOSIS — Z12.11 COLON CANCER SCREENING: ICD-10-CM

## 2021-06-25 DIAGNOSIS — Z00.00 ROUTINE GENERAL MEDICAL EXAMINATION AT A HEALTH CARE FACILITY: ICD-10-CM

## 2021-06-25 DIAGNOSIS — N95.9 POST MENOPAUSAL PROBLEMS: ICD-10-CM

## 2021-06-25 PROCEDURE — G0009 ADMIN PNEUMOCOCCAL VACCINE: HCPCS

## 2021-06-25 PROCEDURE — 90732 PPSV23 VACC 2 YRS+ SUBQ/IM: CPT

## 2021-06-25 PROCEDURE — G0463 HOSPITAL OUTPT CLINIC VISIT: HCPCS

## 2021-06-25 PROCEDURE — 250N000011 HC RX IP 250 OP 636

## 2021-06-25 PROCEDURE — G0439 PPPS, SUBSEQ VISIT: HCPCS | Performed by: INTERNAL MEDICINE

## 2021-06-25 RX ORDER — ROSUVASTATIN CALCIUM 5 MG/1
5 TABLET, COATED ORAL DAILY
Qty: 30 TABLET | Refills: 0 | Status: SHIPPED | OUTPATIENT
Start: 2021-06-25 | End: 2021-08-18

## 2021-06-25 ASSESSMENT — ANXIETY QUESTIONNAIRES
6. BECOMING EASILY ANNOYED OR IRRITABLE: NOT AT ALL
7. FEELING AFRAID AS IF SOMETHING AWFUL MIGHT HAPPEN: NOT AT ALL
2. NOT BEING ABLE TO STOP OR CONTROL WORRYING: NOT AT ALL
5. BEING SO RESTLESS THAT IT IS HARD TO SIT STILL: NOT AT ALL
3. WORRYING TOO MUCH ABOUT DIFFERENT THINGS: NOT AT ALL
GAD7 TOTAL SCORE: 0
1. FEELING NERVOUS, ANXIOUS, OR ON EDGE: NOT AT ALL

## 2021-06-25 ASSESSMENT — MIFFLIN-ST. JEOR: SCORE: 1040.91

## 2021-06-25 ASSESSMENT — PAIN SCALES - GENERAL: PAINLEVEL: NO PAIN (0)

## 2021-06-25 ASSESSMENT — PATIENT HEALTH QUESTIONNAIRE - PHQ9
5. POOR APPETITE OR OVEREATING: NOT AT ALL
SUM OF ALL RESPONSES TO PHQ QUESTIONS 1-9: 0

## 2021-06-25 NOTE — PATIENT INSTRUCTIONS
Preventive Health Recommendations    See your health care provider every year to    Review health changes.     Discuss preventive care.      Review your medicines if your doctor has prescribed any.      You no longer need a yearly Pap test unless you've had an abnormal Pap test in the past 10 years. If you have vaginal symptoms, such as bleeding or discharge, be sure to talk with your provider about a Pap test.      Every 1 to 2 years, have a mammogram.  If you are over 69, talk with your health care provider about whether or not you want to continue having screening mammograms.      Every 10 years, have a colonoscopy. Or, have a yearly FIT test (stool test). These exams will check for colon cancer.       Have a cholesterol test every 5 years, or more often if your doctor advises it.       Have a diabetes test (fasting glucose) every three years. If you are at risk for diabetes, you should have this test more often.       At age 65, have a bone density scan (DEXA) to check for osteoporosis (brittle bone disease).    Shots:    Get a flu shot each year.    Get a tetanus shot every 10 years.    Talk to your doctor about your pneumonia vaccines. There are now two you should receive - Pneumovax (PPSV 23) and Prevnar (PCV 13).    Talk to your pharmacist about the shingles vaccine.    Talk to your doctor about the hepatitis B vaccine.    Nutrition:     Eat at least 5 servings of fruits and vegetables each day.      Eat whole-grain bread, whole-wheat pasta and brown rice instead of white grains and rice.      Get adequate about Calcium and Vitamin D.     Lifestyle    Exercise at least 150 minutes a week (30 minutes a day, 5 days a week). This will help you control your weight and prevent disease.      Limit alcohol to one drink per day.      No smoking.       Wear sunscreen to prevent skin cancer.       See your dentist twice a year for an exam and cleaning.      See your eye doctor every 1 to 2 years to screen for  conditions such as glaucoma, macular degeneration, cataracts, etc.    Personalized Prevention Plan  You are due for the preventive services outlined below.  Your care team is available to assist you in scheduling these services.  If you have already completed any of these items, please share that information with your care team to update in your medical record.    Health Maintenance Due   Topic Date Due     Cholesterol Lab  Never done     FALL RISK ASSESSMENT  Never done     Pneumococcal Vaccine (2 of 2 - PPSV23) 10/11/2020     Colorectal Cancer Screening  07/07/2021

## 2021-06-25 NOTE — LETTER
"6/25/2021       RE: Nubia Lee  [address] [address]     Dear Colleague,    Thank you for referring your patient, Nubia Lee, to the [facility] [place] [facility]. Please see a copy of my visit note below.    SUBJECTIVE:   Nubia Lee is a 71 year old female who presents for Preventive Visit.      Patient has been advised of split billing requirements and indicates understanding: Yes  Are you in the first 12 months of your Medicare Part B coverage?  No    Physical Health:    In general, how would you rate your overall physical health? good    Outside of work, how many days during the week do you exercise? none    Outside of work, approximately how many minutes a day do you exercise?not applicable    If you drink alcohol do you typically have >3 drinks per day or >7 drinks per week? No    Do you usually eat at least 4 servings of fruit and vegetables a day, include whole grains & fiber and avoid regularly eating high fat or \"junk\" foods? Yes    Do you have any problems taking medications regularly?  No    Do you have any side effects from medications? none    Needs assistance for the following daily activities: no assistance needed    Which of the following safety concerns are present in your home?  none identified     Hearing impairment: No    In the past 6 months, have you been bothered by leaking of urine? no    Mental Health:    In general, how would you rate your overall mental or emotional health? good  PHQ-2 Score: 0    Do you feel safe in your environment? Yes    Have you ever done Advance Care Planning? (For example, a Health Directive, POLST, or a discussion with a medical provider or your loved ones about your wishes): No, advance care planning information given to patient to review.  Patient plans to discuss their wishes with loved ones or provider.      Additional concerns to address?  " No    Fall risk:  Fallen 2 or more times in the past year?: No  Any fall with injury in the past year?: No    Cognitive Screenin) Repeat 3 items (Leader, Season, Table)    2) Clock draw: NORMAL  3) 3 item recall: Recalls 3 objects  Results: 3 items recalled: COGNITIVE IMPAIRMENT LESS LIKELY    Mini-CogTM Copyright JEANCARLOS Nicole. Licensed by the author for use in [facility]; reprinted with permission (gloria@[facility]). All rights reserved.      Do you have sleep apnea, excessive snoring or daytime drowsiness?: no        -------------------------------------    Reviewed and updated as needed this visit by clinical staff  Tobacco  Allergies  Meds              Reviewed and updated as needed this visit by Provider                Social History     Tobacco Use     Smoking status: Former Smoker     Quit date:      Years since quittin.4     Smokeless tobacco: Never Used   Substance Use Topics     Alcohol use: Yes     Frequency: Monthly or less                           Current providers sharing in care for this patient include:   Patient Care Team:  Lindsey Romero APRN CNP as PCP - General (Nurse Practitioner)  Lindsey Romero APRN CNP as Assigned OBGYN Provider  Cassandra Chambers MD as MD (Internal Medicine)    The following health maintenance items are reviewed in Epic and correct as of today:  Health Maintenance   Topic Date Due     ADVANCE CARE PLANNING  Never done     LIPID  Never done     MEDICARE ANNUAL WELLNESS VISIT  Never done     FALL RISK ASSESSMENT  Never done     Pneumococcal Vaccine: 65+ Years (2 of 2 - PPSV23) 10/11/2020     COLORECTAL CANCER SCREENING  2021     MAMMO SCREENING  2021     INFLUENZA VACCINE (Season Ended) 2021     DEXA  10/24/2023     DTAP/TDAP/TD IMMUNIZATION (3 - Td) 10/11/2029     HEPATITIS C SCREENING  Completed     PHQ-2  Completed     ZOSTER IMMUNIZATION  Completed     COVID-19 Vaccine  Completed     IPV IMMUNIZATION  Aged  "Out     MENINGITIS IMMUNIZATION  Aged Out     HEPATITIS B IMMUNIZATION  Aged Out     Labs reviewed in [facility]  Mammogram Screening: Mammogram Screening: Recommended mammography every 1-2 years with patient discussion and risk factor consideration    ROS:  Constitutional, HEENT, cardiovascular, pulmonary, GI, , musculoskeletal, neuro, skin, endocrine and psych systems are negative, except as otherwise noted.    OBJECTIVE:   /73   Pulse 94   Ht 1.613 m (5' 3.5\")   Wt 54.9 kg (121 lb)   Breastfeeding No   BMI 21.10 kg/m   Estimated body mass index is 21.1 kg/m  as calculated from the following:    Height as of this encounter: 1.613 m (5' 3.5\").    Weight as of this encounter: 54.9 kg (121 lb).  EXAM:   GENERAL APPEARANCE: healthy, alert and no distress  EYES: Eyes grossly normal to inspection, PERRL and conjunctivae and sclerae normal  HENT: ear canals and TM's normal, nose and mouth without ulcers or lesions, oropharynx clear and oral mucous membranes moist  NECK: no adenopathy, no asymmetry, masses, or scars and thyroid normal to palpation  RESP: lungs clear to auscultation - no rales, rhonchi or wheezes  CV: regular rate and rhythm, normal S1 S2, no S3 or S4, no murmur, click or rub, no peripheral edema and peripheral pulses strong  ABDOMEN: soft, nontender, no hepatosplenomegaly, no masses and bowel sounds normal  MS: no musculoskeletal defects are noted and gait is age appropriate without ataxia  SKIN: no suspicious lesions or rashes  NEURO: Normal strength and tone, sensory exam grossly normal, mentation intact and speech normal  PSYCH: mentation appears normal and affect normal/bright    Diagnostic Test Results:  Labs reviewed in Epic    ASSESSMENT / PLAN:   1. Familial hypercholesterolemia  Recommend to continue with statin therapy without changes.   - rosuvastatin (CRESTOR) 5 MG tablet; Take 1 tablet (5 mg) by mouth daily (Please have labs drawn at up coming visit on 6/25/2021 for further refills) " "Thank you  Dispense: 30 tablet; Refill: 0  - Lipid Profile; Future    2. Hypercalcemia  Will recheck calcium level as well as PTH and vitamin D level.   - Basic metabolic panel; Future  - TSH with free T4 reflex; Future  - 25- OH-Vitamin D; Future  - Parathyroid Hormone Intact; Future    3. Colon cancer screening  Reviewed options for colon cancer screening. Patient opted for FIT test.   - Fecal colorectal cancer screen FIT; Future    4. Need for pneumococcal vaccine    - Pneumococcal vaccine 23 valent PPSV23  (Pneumovax) [19359]  - ADMIN MEDICARE: Pneumococcal Vaccine ()    5. Postmenopausal status    - Dexa hip/pelvis/spine; Future    6. Snoring    - SLEEP EVALUATION & MANAGEMENT REFERRAL - ADULT -[facility] [phone] (Age 15 and up); Future    7. Varicose veins of both legs with edema  Referral to the [facility] was provided today.   - VASCULAR SURGERY REFERRAL; Future    8. Routine general medical examination at a health care facility        Patient has been advised of split billing requirements and indicates understanding: Yes    COUNSELING:  Reviewed preventive health counseling, as reflected in patient instructions       Regular exercise       Healthy diet/nutrition       Vision screening    Estimated body mass index is 21.1 kg/m  as calculated from the following:    Height as of this encounter: 1.613 m (5' 3.5\").    Weight as of this encounter: 54.9 kg (121 lb).        She reports that she quit smoking about 26 years ago. She has never used smokeless tobacco.    Appropriate preventive services were discussed with this patient, including applicable screening as appropriate for cardiovascular disease, diabetes, osteopenia/osteoporosis, and glaucoma.  As appropriate for age/gender, discussed screening for colorectal cancer, prostate cancer, breast cancer, and cervical cancer. Checklist reviewing preventive services available has been given to the patient.    Reviewed patients " plan of care and provided an AVS. The Basic Care Plan (routine screening as documented in Health Maintenance) for Nubia meets the Care Plan requirement. This Care Plan has been established and reviewed with the Patient.    Counseling Resources:  ATP IV Guidelines  Pooled Cohorts Equation Calculator  Breast Cancer Risk Calculator  BRCA-Related Cancer Risk Assessment: FHS-7 Tool  FRAX Risk Assessment  ICSI Preventive Guidelines  Dietary Guidelines for Americans, 2010  USDA's MyPlate  ASA Prophylaxis  Lung CA Screening    Cassandra Chambers MD  [facility] [place]

## 2021-06-25 NOTE — PROGRESS NOTES
"SUBJECTIVE:   Nubia Lee is a 71 year old female who presents for Preventive Visit.      Patient has been advised of split billing requirements and indicates understanding: Yes  Are you in the first 12 months of your Medicare Part B coverage?  No    Physical Health:    In general, how would you rate your overall physical health? good    Outside of work, how many days during the week do you exercise? none    Outside of work, approximately how many minutes a day do you exercise?not applicable    If you drink alcohol do you typically have >3 drinks per day or >7 drinks per week? No    Do you usually eat at least 4 servings of fruit and vegetables a day, include whole grains & fiber and avoid regularly eating high fat or \"junk\" foods? Yes    Do you have any problems taking medications regularly?  No    Do you have any side effects from medications? none    Needs assistance for the following daily activities: no assistance needed    Which of the following safety concerns are present in your home?  none identified     Hearing impairment: No    In the past 6 months, have you been bothered by leaking of urine? no    Mental Health:    In general, how would you rate your overall mental or emotional health? good  PHQ-2 Score: 0    Do you feel safe in your environment? Yes    Have you ever done Advance Care Planning? (For example, a Health Directive, POLST, or a discussion with a medical provider or your loved ones about your wishes): No, advance care planning information given to patient to review.  Patient plans to discuss their wishes with loved ones or provider.      Additional concerns to address?  No    Fall risk:  Fallen 2 or more times in the past year?: No  Any fall with injury in the past year?: No    Cognitive Screenin) Repeat 3 items (Leader, Season, Table)    2) Clock draw: NORMAL  3) 3 item recall: Recalls 3 objects  Results: 3 items recalled: COGNITIVE IMPAIRMENT LESS LIKELY    Mini-CogTM Copyright S " Corey. Licensed by the author for use in [facility]; reprinted with permission (gloria@[facility]). All rights reserved.      Do you have sleep apnea, excessive snoring or daytime drowsiness?: no        -------------------------------------    Reviewed and updated as needed this visit by clinical staff  Tobacco  Allergies  Meds              Reviewed and updated as needed this visit by Provider                Social History     Tobacco Use     Smoking status: Former Smoker     Quit date:      Years since quittin.4     Smokeless tobacco: Never Used   Substance Use Topics     Alcohol use: Yes     Frequency: Monthly or less                           Current providers sharing in care for this patient include:   Patient Care Team:  Lindsey Romero APRN CNP as PCP - General (Nurse Practitioner)  Lindsey Romero APRN CNP as Assigned OBGYN Provider  Cassandra Chambers MD as MD (Internal Medicine)    The following health maintenance items are reviewed in Epic and correct as of today:  Health Maintenance   Topic Date Due     ADVANCE CARE PLANNING  Never done     LIPID  Never done     MEDICARE ANNUAL WELLNESS VISIT  Never done     FALL RISK ASSESSMENT  Never done     Pneumococcal Vaccine: 65+ Years (2 of 2 - PPSV23) 10/11/2020     COLORECTAL CANCER SCREENING  2021     MAMMO SCREENING  2021     INFLUENZA VACCINE (Season Ended) 2021     DEXA  10/24/2023     DTAP/TDAP/TD IMMUNIZATION (3 - Td) 10/11/2029     HEPATITIS C SCREENING  Completed     PHQ-2  Completed     ZOSTER IMMUNIZATION  Completed     COVID-19 Vaccine  Completed     IPV IMMUNIZATION  Aged Out     MENINGITIS IMMUNIZATION  Aged Out     HEPATITIS B IMMUNIZATION  Aged Out     Labs reviewed in [facility]  Mammogram Screening: Mammogram Screening: Recommended mammography every 1-2 years with patient discussion and risk factor consideration    ROS:  Constitutional, HEENT, cardiovascular, pulmonary, GI, ,  "musculoskeletal, neuro, skin, endocrine and psych systems are negative, except as otherwise noted.    OBJECTIVE:   /73   Pulse 94   Ht 1.613 m (5' 3.5\")   Wt 54.9 kg (121 lb)   Breastfeeding No   BMI 21.10 kg/m   Estimated body mass index is 21.1 kg/m  as calculated from the following:    Height as of this encounter: 1.613 m (5' 3.5\").    Weight as of this encounter: 54.9 kg (121 lb).  EXAM:   GENERAL APPEARANCE: healthy, alert and no distress  EYES: Eyes grossly normal to inspection, PERRL and conjunctivae and sclerae normal  HENT: ear canals and TM's normal, nose and mouth without ulcers or lesions, oropharynx clear and oral mucous membranes moist  NECK: no adenopathy, no asymmetry, masses, or scars and thyroid normal to palpation  RESP: lungs clear to auscultation - no rales, rhonchi or wheezes  CV: regular rate and rhythm, normal S1 S2, no S3 or S4, no murmur, click or rub, no peripheral edema and peripheral pulses strong  ABDOMEN: soft, nontender, no hepatosplenomegaly, no masses and bowel sounds normal  MS: no musculoskeletal defects are noted and gait is age appropriate without ataxia  SKIN: no suspicious lesions or rashes  NEURO: Normal strength and tone, sensory exam grossly normal, mentation intact and speech normal  PSYCH: mentation appears normal and affect normal/bright    Diagnostic Test Results:  Labs reviewed in Epic    ASSESSMENT / PLAN:   1. Familial hypercholesterolemia  Recommend to continue with statin therapy without changes.   - rosuvastatin (CRESTOR) 5 MG tablet; Take 1 tablet (5 mg) by mouth daily (Please have labs drawn at up coming visit on 6/25/2021 for further refills) Thank you  Dispense: 30 tablet; Refill: 0  - Lipid Profile; Future    2. Hypercalcemia  Will recheck calcium level as well as PTH and vitamin D level.   - Basic metabolic panel; Future  - TSH with free T4 reflex; Future  - 25- [place]-Vitamin D; Future  - Parathyroid Hormone Intact; Future    3. Colon cancer " "screening  Reviewed options for colon cancer screening. Patient opted for FIT test.   - Fecal colorectal cancer screen FIT; Future    4. Need for pneumococcal vaccine    - Pneumococcal vaccine 23 valent PPSV23  (Pneumovax) [80786]  - ADMIN MEDICARE: Pneumococcal Vaccine ()    5. Postmenopausal status    - Dexa hip/pelvis/spine; Future    6. Snoring    - SLEEP EVALUATION & MANAGEMENT REFERRAL - ADULT -[facility] [place] [phone] (Age 15 and up); Future    7. Varicose veins of both legs with edema  Referral to the [facility]IN Clinic was provided today.   - VASCULAR SURGERY REFERRAL; Future    8. Routine general medical examination at a health care facility        Patient has been advised of split billing requirements and indicates understanding: Yes    COUNSELING:  Reviewed preventive health counseling, as reflected in patient instructions       Regular exercise       Healthy diet/nutrition       Vision screening    Estimated body mass index is 21.1 kg/m  as calculated from the following:    Height as of this encounter: 1.613 m (5' 3.5\").    Weight as of this encounter: 54.9 kg (121 lb).        She reports that she quit smoking about 26 years ago. She has never used smokeless tobacco.    Appropriate preventive services were discussed with this patient, including applicable screening as appropriate for cardiovascular disease, diabetes, osteopenia/osteoporosis, and glaucoma.  As appropriate for age/gender, discussed screening for colorectal cancer, prostate cancer, breast cancer, and cervical cancer. Checklist reviewing preventive services available has been given to the patient.    Reviewed patients plan of care and provided an AVS. The Basic Care Plan (routine screening as documented in Health Maintenance) for Nubia meets the Care Plan requirement. This Care Plan has been established and reviewed with the Patient.    Counseling Resources:  ATP IV Guidelines  Pooled Cohorts Equation " Calculator  Breast Cancer Risk Calculator  BRCA-Related Cancer Risk Assessment: FHS-7 Tool  FRAX Risk Assessment  ICSI Preventive Guidelines  Dietary Guidelines for Americans, 2010  USDA's MyPlate  ASA Prophylaxis  Lung CA Screening    Cassandra Chambers MD  formerly [facility]

## 2021-06-26 ASSESSMENT — ANXIETY QUESTIONNAIRES: GAD7 TOTAL SCORE: 0

## 2021-06-29 ENCOUNTER — TELEPHONE (OUTPATIENT)
Dept: VASCULAR SURGERY | Facility: CLINIC | Age: 71
End: 2021-06-29

## 2021-06-29 DIAGNOSIS — I83.893 SYMPTOMATIC VARICOSE VEINS OF BOTH LOWER EXTREMITIES: Primary | ICD-10-CM

## 2021-06-29 NOTE — TELEPHONE ENCOUNTER
I called and spoke with Nubia.  She has left low leg swelling, discoloration on her right foot, she notes bulging varicosities bilaterally, has cold feet always, she is able to walk a mile before any calf pain, she has history of right leg ulcers, nothing currently.  She wears compression stockings in the winter, denies vein treatment.  Her last ultrasound was over a year ago.  Will plan for updated venous competency ultrasound and visit with vascular provider.  Pt has achy sensation legs on and off.  DENISHA Hoover RN, BSN  Interventional Radiology Nurse Coordinator   Phone:  [phone]

## 2021-06-30 DIAGNOSIS — Z12.11 COLON CANCER SCREENING: ICD-10-CM

## 2021-06-30 PROCEDURE — 82274 ASSAY TEST FOR BLOOD FECAL: CPT | Performed by: INTERNAL MEDICINE

## 2021-07-02 LAB — HEMOCCULT STL QL IA: NEGATIVE

## 2021-07-05 NOTE — TELEPHONE ENCOUNTER
DIAGNOSIS: new varicose vein    DATE RECEIVED: 8.23.21   NOTES STATUS DETAILS   OFFICE NOTE from referring provider Internal 6.25.21 SARAH Chambers [facility]   OFFICE NOTE from other specialist Care Everywhere 1.13.20 Jus Cano   OPERATIVE REPORT N/A    MEDICATION LIST Internal    PERTINENT LABS Internal Last draw 7.8.20   CTA (CT ANGIOGRAPHY) N/A    CT N/A    MRI N/A    ULTRASOUND Pacs 1.13.20  US lower extremity venous, [place]    10.14.19  Lower Arterial Vascular     10.14.19 lower arterial vascular, [place]- Requested  Action 7.5.21    Action Taken Requested images from [place].    Images resolved to Pacs.

## 2021-08-04 RX ORDER — ASCORBIC ACID 100 MG
TABLET,CHEWABLE ORAL
COMMUNITY
End: 2024-03-11

## 2021-08-04 RX ORDER — UBIDECARENONE 100 MG
100 CAPSULE ORAL DAILY
COMMUNITY

## 2021-08-04 NOTE — PROGRESS NOTES
Nubia Lee is a 71 year old who is being evaluated via a billable video visit.      How would you like to obtain your AVS? MyChart  If the video visit is dropped, the invitation should be resent by: Text to cell phone: [phone]  Will anyone else be joining your video visit? No    Does patient have any form of state insurance?Yes   Do you have wifi? Yes  Do you have a smart phone/device?Yes  Can you download an gabriella on your phone comfortably with out assistance including You Tube? Yes    Jennifer Sarmiento MA    Video Start Time: 9:58 AM  Video-Visit Details    Type of service:  Video Visit    Video End Time:10:18 AM    Originating Location (pt. Location): Home    Distant Location (provider location):  @[email or web address]@     Platform used for Video Visit: AmWell     Additional 15 minutes was spent performing the following:    -Preparing to see the patient  -Ordering medications, tests, or procedures   -Documenting clinical information in the electronic or other health record     Thank you for the opportunity to participate in the care of  Nubia Lee.    Assessment and Plan:    In summary Nubia Lee is a 71 year old year old female here for sleep disturbance.  1. Witness apnea/Hypersomnia/Snoring/Sleep related headaches   Nubia Lee has high risk for obstructive sleep apnea based on the history of witness apnea, hypersomnia, snoring and a crowded airway. I educated the patient on the underlying pathophysiology of obstructive sleep apnea. We reviewed the risks associated with sleep apnea, including increased cardiovascular risk and overall death. We talked about treatments briefly. I recommend getting an baseline nocturnal polysomnography. The patient should return to the clinic to discuss results and treatment option in a patient-centered approach.    History of present illness:    She is a 71 year old female who comes to the [facility] clinic with a chief complaints of excessive daytime  sleepiness that is been going on for more than 2 years.  The patient has been told that she has pauses in her breathing during sleep followed by loud snoring.  She also has awoken herself up gasping for breath and feels as if she is stopping breathing during sleep.  The patient also complains of waking up with headaches.     Ideal Sleep-Wake Cycle(devoid of societal pressure):    Patient would try to initiate sleep at around 11-12 at night with a sleep latency of less than 10 minutes. The patient would have 1 awakenings. Final wake up time is around 8 AM.     Total score - Williamson: 13 (8/3/2021 11:50 AM)    Patient told to return in one week after the sleep study is interpreted.    Patient Active Problem List   Diagnosis     Personal history of nicotine dependence     Osteopenia     Hypervitaminosis D     Hyperlipidemia     Varicose veins of left lower extremity with other complications     History of malignant neoplasm of breast     Hypercalcemia     Atherosclerosis of arteries of extremities (H)     Abnormal results of pulmonary function studies     Genital warts     Elevated parathyroid hormone     Past Medical History:   Diagnosis Date     Breast cancer (H)      Ectopic pregnancy      Osteopenia      Palpitations      Past Surgical History:   Procedure Laterality Date     LUMPECTOMY BREAST Right      Current Outpatient Medications   Medication Sig Dispense Refill     AMINO ACIDS COMPLEX PO Take 1 mL by mouth       Ascorbic Acid (VITAMIN C) 100 MG CHEW        aspirin-acetaminophen-caffeine (EXCEDRIN MIGRAINE) 250-250-65 MG tablet Take 1 tablet by mouth       cholecalciferol (VITAMIN D) 200 units (5 mcg) TABS half-tab Take 50 mcg by mouth       co-enzyme Q-10 100 MG CAPS capsule Take 100 mg by mouth daily       Magnesium Oxide 140 MG CAPS Take 400 mg by mouth       methylphenidate (RITALIN) 10 MG tablet TAKE 1 TO 2 TABLETS BY MOUTH ONCE DAILY       methylphenidate HCl ER (CONCERTA) 18 MG CR tablet TAKE 2 TABLETS  BY MOUTH IN THE MORNING       Multiple Vitamins-Minerals (MULTIVITAMIN ADULT EXTRA C PO) Take 2 tablets by mouth       rosuvastatin (CRESTOR) 5 MG tablet Take 1 tablet (5 mg) by mouth daily (Please have labs drawn at up coming visit on 2021 for further refills) Thank you 30 tablet 0     vortioxetine (TRINTELLIX) 10 MG tablet Take 10 mg by mouth daily       desipramine (NORPRAMIN) 50 MG tablet Take 25 mg by mouth daily       Patient has no known allergies.  Social History     Socioeconomic History     Marital status:      Spouse name: Not on file     Number of children: Not on file     Years of education: Not on file     Highest education level: Not on file   Occupational History     Not on file   Tobacco Use     Smoking status: Former Smoker     Quit date:      Years since quittin.6     Smokeless tobacco: Never Used   Substance and Sexual Activity     Alcohol use: Yes     Drug use: Never     Sexual activity: Not Currently     Partners: Male   Other Topics Concern     Not on file   Social History Narrative     Not on file     Social Determinants of Health     Financial Resource Strain:      Difficulty of Paying Living Expenses:    Food Insecurity:      Worried About Running Out of Food in the Last Year:      Ran Out of Food in the Last Year:    Transportation Needs:      Lack of Transportation (Medical):      Lack of Transportation (Non-Medical):    Physical Activity:      Days of Exercise per Week:      Minutes of Exercise per Session:    Stress:      Feeling of Stress :    Social Connections:      Frequency of Communication with Friends and Family:      Frequency of Social Gatherings with Friends and Family:      Attends Anabaptism Services:      Active Member of Clubs or Organizations:      Attends Club or Organization Meetings:      Marital Status:    Intimate Partner Violence:      Fear of Current or Ex-Partner:      Emotionally Abused:      Physically Abused:      Sexually Abused:       History reviewed. No pertinent family history.     Physical Exam:  GEN: NAD,   Head: Normocephalic.  EYES: EOMI  ENT: Oropharynx is clear, Pollack class 4+ airway.   Psych: normal mood, normal affect  Snore test:(+)     Labs/Studies:     No results found for: PH, PHARTERIAL, PO2, [id number], SAT, PCO2, HCO3, BASEEXCESS, WANDA, BEB  Lab Results   Component Value Date    TSH 0.46 09/09/2010     Lab Results   Component Value Date    GLC 84 07/08/2020    GLC 84 09/09/2010     Lab Results   Component Value Date    HGB 13.6 09/09/2010    HGB 13.8 10/16/2008     Lab Results   Component Value Date    BUN 29 07/08/2020    BUN 20 09/09/2010    CR 0.61 07/08/2020    CR 0.70 09/09/2010     Lab Results   Component Value Date    AST 24 07/08/2020    AST 27 09/09/2010    ALT 37 07/08/2020    ALT 17 09/09/2010    ALKPHOS 66 07/08/2020    ALKPHOS 63 09/09/2010    BILITOTAL 0.7 07/08/2020    BILITOTAL 0.5 09/09/2010     No results found for: UAMP, UBARB, BENZODIAZEUR, UCANN, UCOC, OPIT, UPCP    Recent Labs   Lab Test 07/08/20  0851      POTASSIUM 4.2   CHLORIDE 106   CO2 30   ANIONGAP 6   GLC 84   BUN 29   CR 0.61   TAM 9.6       No results found for: NAVIN Mcgee DO  Board Certified in Internal Medicine and Sleep Medicine    (Note created with Dragon voice recognition and unintended spelling errors and word substitutions may occur)

## 2021-08-05 ENCOUNTER — VIRTUAL VISIT (OUTPATIENT)
Dept: SLEEP MEDICINE | Facility: CLINIC | Age: 71
End: 2021-08-05
Attending: INTERNAL MEDICINE
Payer: COMMERCIAL

## 2021-08-05 DIAGNOSIS — G47.10 HYPERSOMNIA: ICD-10-CM

## 2021-08-05 DIAGNOSIS — R51.9 SLEEP RELATED HEADACHES: ICD-10-CM

## 2021-08-05 DIAGNOSIS — R06.81 WITNESSED EPISODE OF APNEA: Primary | ICD-10-CM

## 2021-08-05 DIAGNOSIS — R06.83 SNORING: ICD-10-CM

## 2021-08-05 PROCEDURE — 99203 OFFICE O/P NEW LOW 30 MIN: CPT | Mod: 95 | Performed by: INTERNAL MEDICINE

## 2021-08-05 NOTE — PATIENT INSTRUCTIONS
Patient education: What is a sleep study?     What is a sleep study? -- A sleep study is a test that measures how well you sleep and checks for sleep problems. For some sleep studies, you stay overnight in a sleep lab at a hospital or sleep center.     What happens during a sleep study? -- Before you go to sleep, a technician attaches small, sticky patches called  electrodes  to your head, chest, and legs. He or she will also place a small tube beneath your nose and might wrap 1 or 2 belts around your chest.   Each of these items has wires that connect to monitors. The monitors record your movement, brain activity, breathing, and other body functions while you sleep.  If you have a history of trouble falling asleep, your doctor might prescribe a medicine to help you fall asleep in the lab. If you have never taken the medicine before, your doctor might ask you take it on a night before your sleep study to see how it affects you.   Why might my doctor order a sleep study? -- Your doctor will order a sleep study if he or she thinks you have sleep apnea or a different condition that makes you:   ?Have sudden jerking leg movements while you sleep, called  periodic limb movements.    ?Feel very sleepy during the day and fall asleep all of a sudden, called  narcolepsy.    ?Have trouble falling asleep or staying asleep over a long period of time, called  chronic insomnia.    ?Do odd things while you sleep, such as walking.  How should I prepare for a sleep study? -- On the day of your sleep study, you should:   ?Avoid alcohol   ?Avoid drinking coffee, tea, sodas, and other drinks that have caffeine in the afternoon and evening   ?Take all of your regular medicines     The cost of care estimate line is [phone]. They are able to give the patient an estimate of the charges and also an estimate of their insurance coverage/patient responsibility.   After your sleep study is performed, please call us at [phone] or  [phone]  to schedule for a follow up to review the results of the sleep study.    Please bring one tab of low dose melatonin 3 mg or less to the night of the study.    Melatonin intake is completely voluntary.    You may take own melatonin after arrival to sleep center. Do not drive or operate machinery after intake of melatonin.

## 2021-08-06 ENCOUNTER — LAB (OUTPATIENT)
Dept: LAB | Facility: CLINIC | Age: 71
End: 2021-08-06
Payer: COMMERCIAL

## 2021-08-06 DIAGNOSIS — E83.52 HYPERCALCEMIA: ICD-10-CM

## 2021-08-06 DIAGNOSIS — E78.01 FAMILIAL HYPERCHOLESTEROLEMIA: ICD-10-CM

## 2021-08-06 LAB
ANION GAP SERPL CALCULATED.3IONS-SCNC: 3 MMOL/L (ref 3–14)
BUN SERPL-MCNC: 28 MG/DL (ref 7–30)
CALCIUM SERPL-MCNC: 9.6 MG/DL (ref 8.5–10.1)
CHLORIDE BLD-SCNC: 110 MMOL/L (ref 94–109)
CHOLEST SERPL-MCNC: 181 MG/DL
CO2 SERPL-SCNC: 28 MMOL/L (ref 20–32)
CREAT SERPL-MCNC: 0.71 MG/DL (ref 0.52–1.04)
FASTING STATUS PATIENT QL REPORTED: YES
GFR SERPL CREATININE-BSD FRML MDRD: 86 ML/MIN/1.73M2
GLUCOSE BLD-MCNC: 103 MG/DL (ref 70–99)
HDLC SERPL-MCNC: 115 MG/DL
LDLC SERPL CALC-MCNC: 56 MG/DL
NONHDLC SERPL-MCNC: 66 MG/DL
POTASSIUM BLD-SCNC: 4.5 MMOL/L (ref 3.4–5.3)
PTH-INTACT SERPL-MCNC: 60 PG/ML (ref 18–80)
SODIUM SERPL-SCNC: 141 MMOL/L (ref 133–144)
TRIGL SERPL-MCNC: 52 MG/DL
TSH SERPL DL<=0.005 MIU/L-ACNC: 0.99 MU/L (ref 0.4–4)

## 2021-08-06 PROCEDURE — 83970 ASSAY OF PARATHORMONE: CPT

## 2021-08-06 PROCEDURE — 84443 ASSAY THYROID STIM HORMONE: CPT

## 2021-08-06 PROCEDURE — 82306 VITAMIN D 25 HYDROXY: CPT

## 2021-08-06 PROCEDURE — 36415 COLL VENOUS BLD VENIPUNCTURE: CPT

## 2021-08-06 PROCEDURE — 80048 BASIC METABOLIC PNL TOTAL CA: CPT

## 2021-08-06 PROCEDURE — 80061 LIPID PANEL: CPT

## 2021-08-09 LAB — DEPRECATED CALCIDIOL+CALCIFEROL SERPL-MC: 89 UG/L (ref 20–75)

## 2021-08-16 DIAGNOSIS — E78.01 FAMILIAL HYPERCHOLESTEROLEMIA: ICD-10-CM

## 2021-08-18 RX ORDER — ROSUVASTATIN CALCIUM 5 MG/1
5 TABLET, COATED ORAL DAILY
Qty: 90 TABLET | Refills: 3 | Status: SHIPPED | OUTPATIENT
Start: 2021-08-18 | End: 2022-08-15

## 2021-08-18 NOTE — TELEPHONE ENCOUNTER
Rosuvastatin Calcium 5 MG Oral Tablet  Last Written Prescription Date:  6/25/2021  Last Fill Quantity: 30,   # refills: 0  Last Office Visit : 6/25/2021  Future Office visit:  None  90 Tabs, 3 Refills sent to pharm 8/18/2021      Kelsey Perez RN  Central Triage Red Flags/Med Refills

## 2021-08-20 ENCOUNTER — ANCILLARY PROCEDURE (OUTPATIENT)
Dept: ULTRASOUND IMAGING | Facility: CLINIC | Age: 71
End: 2021-08-20
Attending: RADIOLOGY
Payer: COMMERCIAL

## 2021-08-20 DIAGNOSIS — I83.893 SYMPTOMATIC VARICOSE VEINS OF BOTH LOWER EXTREMITIES: ICD-10-CM

## 2021-08-20 PROCEDURE — 93970 EXTREMITY STUDY: CPT | Performed by: RADIOLOGY

## 2021-08-23 ENCOUNTER — OFFICE VISIT (OUTPATIENT)
Dept: VASCULAR SURGERY | Facility: CLINIC | Age: 71
End: 2021-08-23
Payer: COMMERCIAL

## 2021-08-23 ENCOUNTER — PRE VISIT (OUTPATIENT)
Dept: VASCULAR SURGERY | Facility: CLINIC | Age: 71
End: 2021-08-23

## 2021-08-23 VITALS — OXYGEN SATURATION: 97 % | DIASTOLIC BLOOD PRESSURE: 74 MMHG | SYSTOLIC BLOOD PRESSURE: 115 MMHG | HEART RATE: 85 BPM

## 2021-08-23 DIAGNOSIS — I83.893 VARICOSE VEINS OF BOTH LEGS WITH EDEMA: ICD-10-CM

## 2021-08-23 PROCEDURE — 99204 OFFICE O/P NEW MOD 45 MIN: CPT | Performed by: RADIOLOGY

## 2021-08-23 ASSESSMENT — ENCOUNTER SYMPTOMS
ARTHRALGIAS: 0
ORTHOPNEA: 0
DIARRHEA: 0
NAIL CHANGES: 0
POOR WOUND HEALING: 1
EXERCISE INTOLERANCE: 1
JAUNDICE: 0
BLOOD IN STOOL: 0
RECTAL PAIN: 0
MUSCLE CRAMPS: 1
LEG PAIN: 1
SLEEP DISTURBANCES DUE TO BREATHING: 1
PALPITATIONS: 0
NECK PAIN: 0
LIGHT-HEADEDNESS: 0
HOT FLASHES: 0
HEMATURIA: 0
DECREASED LIBIDO: 1
VOMITING: 0
MYALGIAS: 1
SKIN CHANGES: 0
MUSCLE WEAKNESS: 1
DYSURIA: 0
HEARTBURN: 0
DIFFICULTY URINATING: 0
FLANK PAIN: 1
HYPERTENSION: 0
SYNCOPE: 0
BACK PAIN: 1
BLOATING: 0
JOINT SWELLING: 0
NAUSEA: 0
STIFFNESS: 0
ABDOMINAL PAIN: 0
HYPOTENSION: 0
CONSTIPATION: 1
BOWEL INCONTINENCE: 0

## 2021-08-23 NOTE — LETTER
8/23/2021       RE: Nubia Lee  [address] [place] [address]     Dear Colleague,    Thank you for referring your patient, Nubia Lee, to the [facility] [place] at [facility]. Please see a copy of my visit note below.    INTERVENTIONAL RADIOLOGY CONSULTATION    Name: Nubia Lee  Age: 71 year old   Referring Physician: Dr. Chambers   REASON FOR REFERRAL: Left leg discomfort and superficial area of prominent veins    HPI: Ms. Nubia Lee is a very pleasant 71-year-old female with past medical history of breast cancer who is here in my office today with chief complaints of bilateral feet numbness and feeling of cold.  She shares with me that she is uncertain if this is related to her breast cancer and chemotherapy.  She also shares with me that she notices some swelling in her left ankle and foot.  She is particularly concerned with a lot of discoloration that she notices.  She has had this problem for a long time, more so like a few years.  She shares with me that she sometimes has pain which is achy in nature and cramping at nights.  Pain can come on at any time.  Some days she does not experience any pain.  The pain does not Messerly progress towards the end of her day.  Standing up does not necessarily worsen her symptoms.  Neither does elevation resolve her symptoms.  She has used compression stocking in the armando, less swelling but the pain does not change symptoms really. It feels better and more energized. No pain in the toes, only numbness.  Blue discoloration of both feet.    PAST MEDICAL HISTORY:   Past Medical History:   Diagnosis Date     Breast cancer (H)      Ectopic pregnancy      Osteopenia      Palpitations        PAST SURGICAL HISTORY:   Past Surgical History:   Procedure Laterality Date     LUMPECTOMY BREAST Right        FAMILY HISTORY:   Family History   Problem Relation Age of Onset     Snoring  Father        SOCIAL HISTORY:   Social History     Tobacco Use     Smoking status: Former Smoker     Quit date:      Years since quittin.6     Smokeless tobacco: Never Used   Substance Use Topics     Alcohol use: Yes       PROBLEM LIST:   Patient Active Problem List    Diagnosis Date Noted     Genital warts 2020     Priority: Medium     Elevated parathyroid hormone 2020     Priority: Medium     Personal history of nicotine dependence 10/30/2019     Priority: Medium     Osteopenia 10/30/2019     Priority: Medium     Hypervitaminosis D 10/30/2019     Priority: Medium     Hyperlipidemia 10/30/2019     Priority: Medium     Varicose veins of left lower extremity with other complications 10/30/2019     Priority: Medium     History of malignant neoplasm of breast 10/30/2019     Priority: Medium     Hypercalcemia 10/30/2019     Priority: Medium     Atherosclerosis of arteries of extremities (H) 10/30/2019     Priority: Medium     Abnormal results of pulmonary function studies 10/30/2019     Priority: Medium       MEDICATIONS:   Prescription Medications as of 2021       Rx Number Disp Refills Start End Last Dispensed Date Next Fill Date Owning Pharmacy    AMINO ACIDS COMPLEX PO            Sig: Take 1 mL by mouth    Class: Historical    Route: Oral    Ascorbic Acid (VITAMIN C) 100 MG CHEW        [facility] [place] - [address]    Class: Historical    Route: Oral    aspirin-acetaminophen-caffeine (EXCEDRIN MIGRAINE) 250-250-65 MG tablet            Sig: Take 1 tablet by mouth    Class: Historical    Route: Oral    co-enzyme Q-10 100 MG CAPS capsule            Sig: Take 100 mg by mouth daily    Class: Historical    Route: Oral    COMPRESSION STOCKINGS  2 each 4 2021    [address] [place] [address]    Sig: Please measure and distribute 2 pair of 20mmHg - 30mmHg knee high open or closed toe compression stockings with extra refills as indicated. Jobst  ultrasheer or equivalent.    Class: Local Print    Magnesium Oxide 140 MG CAPS            Sig: Take 400 mg by mouth    Class: Historical    Route: Oral    methylphenidate (RITALIN) 10 MG tablet    2/7/2020        Sig: TAKE 1 TO 2 TABLETS BY MOUTH ONCE DAILY    Class: Historical    Earliest Fill Date: 2/7/2020    methylphenidate HCl ER (CONCERTA) 18 MG CR tablet    2/9/2020        Sig: TAKE 2 TABLETS BY MOUTH IN THE MORNING    Class: Historical    Earliest Fill Date: 2/9/2020    Multiple Vitamins-Minerals (MULTIVITAMIN ADULT EXTRA C PO)            Sig: Take 2 tablets by mouth    Class: Historical    Route: Oral    rosuvastatin (CRESTOR) 5 MG tablet  90 tablet 3 8/18/2021    [facility] [address] [place] [address], [place] - [address]    Sig: Take 1 tablet (5 mg) by mouth daily    Class: E-Prescribe    Route: Oral    vortioxetine (TRINTELLIX) 10 MG tablet        [address], [place] - [address] [address]    Sig: Take 10 mg by mouth daily    Class: Historical    Route: Oral    cholecalciferol (VITAMIN D) 200 units (5 mcg) TABS half-tab            Sig: Take 50 mcg by mouth    Class: Historical    Route: Oral    desipramine (NORPRAMIN) 50 MG tablet    10/14/2019        Sig: Take 25 mg by mouth daily    Class: Historical    Route: Oral          ALLERGIES:   Macrodantin [nitrofurantoin]    ROS:  An 11 point review of system was performed and pertinent negative and positives are mentioned in HPI.        Physical Examination:   VITALS:   /74   Pulse 85   SpO2 97%   General:  Pt sitting in chair comfortably.  No acute distress  HEENT: normocephalic.  Neck supple  Neck: no JVD  Resp: nonlabored.   CV: RRR.    Lower extremities/vascular: Symmetrical 2+ DP and PT pulses.  Mild telangiectasia noted bilaterally.  Minimal hemosiderosis.  No advanced venous stasis skin changes.  No scaling.  No large truncal varicosities.  Lymph: no pedal edema  Neuro: Oriented x3.  No evidence of focal motor deficits  Mood:  appropriate affect      Labs:    BMP RESULTS:  Lab Results   Component Value Date     08/06/2021     07/08/2020    POTASSIUM 4.5 08/06/2021    POTASSIUM 4.2 07/08/2020    CHLORIDE 110 (H) 08/06/2021    CHLORIDE 106 07/08/2020    CO2 28 08/06/2021    CO2 30 07/08/2020    ANIONGAP 3 08/06/2021    ANIONGAP 6 07/08/2020     (H) 08/06/2021    GLC 84 07/08/2020    BUN 28 08/06/2021    BUN 29 07/08/2020    CR 0.71 08/06/2021    CR 0.61 07/08/2020    GFRESTIMATED 86 08/06/2021    GFRESTIMATED >90 07/08/2020    GFRESTBLACK >90 07/08/2020    TAM 9.6 08/06/2021    TAM 9.6 07/08/2020        CBC RESULTS:  Lab Results   Component Value Date    WBC 7.5 09/09/2010    RBC 4.18 09/09/2010    HGB 13.6 09/09/2010    HCT 39.8 09/09/2010    MCV 95 09/09/2010    MCH 32.5 09/09/2010    MCHC 34.2 09/09/2010    RDW 12.9 09/09/2010     09/09/2010       INR/PTT:  No results found for: INR, PTT    Diagnostic studies: I personally reviewed the venous competency study dated 2021-08-20.  On the right: Great saphenous vein incompetence is limited to the level of the knee through the mid calf without any demonstrable truncal varicosities.  On the left: Great saphenous vein incompetence limited to the saphenofemoral junction without demonstration of incompetent  or varicose veins.    Assessment 71-year-old female with very minimal superficial venous incompetence that does not warrant any medical treatment.  She does not have a significant history to suggest DVT, peripheral arterial disease, or venoocclusive/venous hypertension disease.  From a venous preventative care and health standpoint she would benefit from using knee-high compression stockings.  If she desires to pursue treatment, this should be performed on a cosmetic vein treatment basis.    Plan   Compression stocking knee high  Referral for cosmetic vein rx    It was a pleasure to participate in Nubia's care today.    Liborio Leggett MD    I spent a total  of 30 minutes face-to-face with Nubia Lee during today's office visit. Over 50% of this time was spent counseling the patient and/or coordinating care. See note for details.     An additional 15 minutes spent on the date of the encounter doing chart review, history and exam, documentation and further activities as noted above          CC  Patient Care Team:  Lindsey Romero APRN CNP as PCP - General (Nurse Practitioner)  Lindsey Romero APRN CNP as Assigned OBGYN Provider  Cassandra Barrera MD as MD (Internal Medicine)  Cassandra Barrera MD as Assigned PCP  CASSANDRA BARRERA        My feet are always cold and numb. I don't know if this to do with breast cancer and chemotherapy. Swelling in my left ankle and foot. In particular I have a lot of discoloration.    Had a for long time    Sometimes have pain, achy pain everywhere and cramping at nights. Pain can be at any time. Some \days I dont have it. Does not progress with the day.    Compression stocking in the armando, less swelling but the pain does not change really. It feels better and more energized.    No pain in the toes, only numbness.  Blue discoloration of both feet.    Compression stocking knee high  Referral for cosmetic vein rx      Answers for HPI/ROS submitted by the patient on 8/23/2021  General Symptoms: No  Skin Symptoms: Yes  HENT Symptoms: No  EYE SYMPTOMS: No  HEART SYMPTOMS: Yes  LUNG SYMPTOMS: No  INTESTINAL SYMPTOMS: Yes  URINARY SYMPTOMS: Yes  GYNECOLOGIC SYMPTOMS: Yes  BREAST SYMPTOMS: No  SKELETAL SYMPTOMS: Yes  BLOOD SYMPTOMS: No  NERVOUS SYSTEM SYMPTOMS: No  MENTAL HEALTH SYMPTOMS: No  Changes in hair: Yes  Changes in moles/birth marks: No  Itching: No  Rashes: No  Changes in nails: No  Acne: No  Hair in places you don't want it: Yes  Change in facial hair: Yes  Warts: Yes  Non-healing sores: Yes  Scarring: No  Flaking of skin: Yes  Color changes of hands/feet in cold : Yes  Sun sensitivity:  Yes  Skin thickening: No  Chest pain or pressure: No  Fast or irregular heartbeat: No  Pain in legs with walking: Yes  Trouble breathing while lying down: No  Fingers or toes appear blue: Yes  High blood pressure: No  Low blood pressure: No  Fainting: No  Murmurs: No  Pacemaker: No  Varicose veins: Yes  Edema or swelling: Yes  Wake up at night with shortness of breath: Yes  Light-headedness: No  Exercise intolerance: Yes  Heart burn or indigestion: No  Nausea: No  Vomiting: No  Abdominal pain: No  Bloating: No  Constipation: Yes  Diarrhea: No  Blood in stool: No  Black stools: No  Rectal or Anal pain: No  Fecal incontinence: No  Yellowing of skin or eyes: No  Vomit with blood: No  Change in stools: No  Trouble holding urine or incontinence: No  Pain or burning: No  Trouble starting or stopping: No  Increased frequency of urination: Yes  Blood in urine: No  Decreased frequency of urination: No  Frequent nighttime urination: Yes  Flank pain: Yes  Difficulty emptying bladder: No  Bleeding or spotting between periods: No  Heavy or painful periods: No  Irregular periods: No  Vaginal discharge: No  Hot flashes: No  Vaginal dryness: Yes  Genital ulcers: No  Reduced libido: Yes  Difficulty with sexual arousal: Yes  Post-menopausal bleeding: No  Back pain: Yes  Muscle aches: Yes  Neck pain: No  Swollen joints: No  Joint pain: No  Bone pain: No  Muscle cramps: Yes  Muscle weakness: Yes  Joint stiffness: No  Bone fracture: No        Again, thank you for allowing me to participate in the care of your patient.      Sincerely,    Liborio Leggett MD

## 2021-08-23 NOTE — PATIENT INSTRUCTIONS
Nubia you have had your consult today with Dr Leggett IR/Vascular regarding your venous insufficiency.   Your Ultrasound completed on 8/20/21 has been reviewed with you during this visit.    Plan:    We have given you prescription for medical grade compression stockings, please wear daily for symptom control.  You do not need to wear at night or when your legs are elevated.  Please fill this prescription at a durable medical equipment supply company.    Any further follow up with be as needed.    Please don't hesitate to call with questions or concerns,     ACachorro Hoover RN, BSN  Interventional Radiology Nurse Coordinator   Phone:  [phone]

## 2021-08-23 NOTE — PROGRESS NOTES
INTERVENTIONAL RADIOLOGY CONSULTATION    Name: Nubia Lee  Age: 71 year old   Referring Physician: Dr. Chambers   REASON FOR REFERRAL: Left leg discomfort and superficial area of prominent veins    HPI: Ms. Nubia Lee is a very pleasant 71-year-old female with past medical history of breast cancer who is here in my office today with chief complaints of bilateral feet numbness and feeling of cold.  She shares with me that she is uncertain if this is related to her breast cancer and chemotherapy.  She also shares with me that she notices some swelling in her left ankle and foot.  She is particularly concerned with a lot of discoloration that she notices.  She has had this problem for a long time, more so like a few years.  She shares with me that she sometimes has pain which is achy in nature and cramping at nights.  Pain can come on at any time.  Some days she does not experience any pain.  The pain does not Messerly progress towards the end of her day.  Standing up does not necessarily worsen her symptoms.  Neither does elevation resolve her symptoms.  She has used compression stocking in the armando, less swelling but the pain does not change symptoms really. It feels better and more energized. No pain in the toes, only numbness.  Blue discoloration of both feet.    PAST MEDICAL HISTORY:   Past Medical History:   Diagnosis Date     Breast cancer (H)      Ectopic pregnancy      Osteopenia      Palpitations        PAST SURGICAL HISTORY:   Past Surgical History:   Procedure Laterality Date     LUMPECTOMY BREAST Right        FAMILY HISTORY:   Family History   Problem Relation Age of Onset     Snoring Father        SOCIAL HISTORY:   Social History     Tobacco Use     Smoking status: Former Smoker     Quit date:      Years since quittin.6     Smokeless tobacco: Never Used   Substance Use Topics     Alcohol use: Yes       PROBLEM LIST:   Patient Active Problem List    Diagnosis Date Noted     Genital  warts 03/21/2020     Priority: Medium     Elevated parathyroid hormone 03/21/2020     Priority: Medium     Personal history of nicotine dependence 10/30/2019     Priority: Medium     Osteopenia 10/30/2019     Priority: Medium     Hypervitaminosis D 10/30/2019     Priority: Medium     Hyperlipidemia 10/30/2019     Priority: Medium     Varicose veins of left lower extremity with other complications 10/30/2019     Priority: Medium     History of malignant neoplasm of breast 10/30/2019     Priority: Medium     Hypercalcemia 10/30/2019     Priority: Medium     Atherosclerosis of arteries of extremities (H) 10/30/2019     Priority: Medium     Abnormal results of pulmonary function studies 10/30/2019     Priority: Medium       MEDICATIONS:   Prescription Medications as of 8/23/2021       Rx Number Disp Refills Start End Last Dispensed Date Next Fill Date Owning Pharmacy    AMINO ACIDS COMPLEX PO            Sig: Take 1 mL by mouth    Class: Historical    Route: Oral    Ascorbic Acid (VITAMIN C) 100 MG CHEW        [facility] [address] [place], [place] [address] - [address]    Class: Historical    Route: Oral    aspirin-acetaminophen-caffeine (EXCEDRIN MIGRAINE) 250-250-65 MG tablet            Sig: Take 1 tablet by mouth    Class: Historical    Route: Oral    co-enzyme Q-10 100 MG CAPS capsule            Sig: Take 100 mg by mouth daily    Class: Historical    Route: Oral    COMPRESSION STOCKINGS  2 each 4 8/23/2021    [address], [place] - [address] [address]    Sig: Please measure and distribute 2 pair of 20mmHg - 30mmHg knee high open or closed toe compression stockings with extra refills as indicated. Jobst ultrasheer or equivalent.    Class: Local Print    Magnesium Oxide 140 MG CAPS            Sig: Take 400 mg by mouth    Class: Historical    Route: Oral    methylphenidate (RITALIN) 10 MG tablet    2/7/2020        Sig: TAKE 1 TO 2 TABLETS BY MOUTH ONCE DAILY    Class: Historical    Earliest Fill Date: 2/7/2020     methylphenidate HCl ER (CONCERTA) 18 MG CR tablet    2/9/2020        Sig: TAKE 2 TABLETS BY MOUTH IN THE MORNING    Class: Historical    Earliest Fill Date: 2/9/2020    Multiple Vitamins-Minerals (MULTIVITAMIN ADULT EXTRA C PO)            Sig: Take 2 tablets by mouth    Class: Historical    Route: Oral    rosuvastatin (CRESTOR) 5 MG tablet  90 tablet 3 8/18/2021    [address] [address] [address]    Sig: Take 1 tablet (5 mg) by mouth daily    Class: E-Prescribe    Route: Oral    vortioxetine (TRINTELLIX) 10 MG tablet        [address], [place] - [address] [address]    Sig: Take 10 mg by mouth daily    Class: Historical    Route: Oral    cholecalciferol (VITAMIN D) 200 units (5 mcg) TABS half-tab            Sig: Take 50 mcg by mouth    Class: Historical    Route: Oral    desipramine (NORPRAMIN) 50 MG tablet    10/14/2019        Sig: Take 25 mg by mouth daily    Class: Historical    Route: Oral          ALLERGIES:   Macrodantin [nitrofurantoin]    ROS:  An 11 point review of system was performed and pertinent negative and positives are mentioned in HPI.        Physical Examination:   VITALS:   /74   Pulse 85   SpO2 97%   General:  Pt sitting in chair comfortably.  No acute distress  HEENT: normocephalic.  Neck supple  Neck: no JVD  Resp: nonlabored.   CV: RRR.    Lower extremities/vascular: Symmetrical 2+ DP and PT pulses.  Mild telangiectasia noted bilaterally.  Minimal hemosiderosis.  No advanced venous stasis skin changes.  No scaling.  No large truncal varicosities.  Lymph: no pedal edema  Neuro: Oriented x3.  No evidence of focal motor deficits  Mood: appropriate affect      Labs:    BMP RESULTS:  Lab Results   Component Value Date     08/06/2021     07/08/2020    POTASSIUM 4.5 08/06/2021    POTASSIUM 4.2 07/08/2020    CHLORIDE 110 (H) 08/06/2021    CHLORIDE 106 07/08/2020    CO2 28 08/06/2021    CO2 30 07/08/2020    ANIONGAP 3 08/06/2021    ANIONGAP 6  07/08/2020     (H) 08/06/2021    GLC 84 07/08/2020    BUN 28 08/06/2021    BUN 29 07/08/2020    CR 0.71 08/06/2021    CR 0.61 07/08/2020    GFRESTIMATED 86 08/06/2021    GFRESTIMATED >90 07/08/2020    GFRESTBLACK >90 07/08/2020    TAM 9.6 08/06/2021    TAM 9.6 07/08/2020        CBC RESULTS:  Lab Results   Component Value Date    WBC 7.5 09/09/2010    RBC 4.18 09/09/2010    HGB 13.6 09/09/2010    HCT 39.8 09/09/2010    MCV 95 09/09/2010    MCH 32.5 09/09/2010    MCHC 34.2 09/09/2010    RDW 12.9 09/09/2010     09/09/2010       INR/PTT:  No results found for: INR, PTT    Diagnostic studies: I personally reviewed the venous competency study dated 2021-08-20.  On the right: Great saphenous vein incompetence is limited to the level of the knee through the mid calf without any demonstrable truncal varicosities.  On the left: Great saphenous vein incompetence limited to the saphenofemoral junction without demonstration of incompetent  or varicose veins.    Assessment 71-year-old female with very minimal superficial venous incompetence that does not warrant any medical treatment.  She does not have a significant history to suggest DVT, peripheral arterial disease, or venoocclusive/venous hypertension disease.  From a venous preventative care and health standpoint she would benefit from using knee-high compression stockings.  If she desires to pursue treatment, this should be performed on a cosmetic vein treatment basis.    Plan   Compression stocking knee high  Referral for cosmetic vein rx    It was a pleasure to participate in Nubia's care today.    Liborio Leggett MD    I spent a total of 30 minutes face-to-face with Nubia Lee during today's office visit. Over 50% of this time was spent counseling the patient and/or coordinating care. See note for details.     An additional 15 minutes spent on the date of the encounter doing chart review, history and exam, documentation and further  activities as noted above          CC  Patient Care Team:  Lindsey Romero APRN CNP as PCP - General (Nurse Practitioner)  Lindsey Romero APRN CNP as Assigned OBGYN Provider  Cassandra Barrera MD as MD (Internal Medicine)  Cassandra Barrera MD as Assigned PCP  CASSANDRA BARRERA        My feet are always cold and numb. I don't know if this to do with breast cancer and chemotherapy. Swelling in my left ankle and foot. In particular I have a lot of discoloration.    Had a for long time    Sometimes have pain, achy pain everywhere and cramping at nights. Pain can be at any time. Some \days I dont have it. Does not progress with the day.    Compression stocking in the armando, less swelling but the pain does not change really. It feels better and more energized.    No pain in the toes, only numbness.  Blue discoloration of both feet.    Compression stocking knee high  Referral for cosmetic vein rx      Answers for HPI/ROS submitted by the patient on 8/23/2021  General Symptoms: No  Skin Symptoms: Yes  HENT Symptoms: No  EYE SYMPTOMS: No  HEART SYMPTOMS: Yes  LUNG SYMPTOMS: No  INTESTINAL SYMPTOMS: Yes  URINARY SYMPTOMS: Yes  GYNECOLOGIC SYMPTOMS: Yes  BREAST SYMPTOMS: No  SKELETAL SYMPTOMS: Yes  BLOOD SYMPTOMS: No  NERVOUS SYSTEM SYMPTOMS: No  MENTAL HEALTH SYMPTOMS: No  Changes in hair: Yes  Changes in moles/birth marks: No  Itching: No  Rashes: No  Changes in nails: No  Acne: No  Hair in places you don't want it: Yes  Change in facial hair: Yes  Warts: Yes  Non-healing sores: Yes  Scarring: No  Flaking of skin: Yes  Color changes of hands/feet in cold : Yes  Sun sensitivity: Yes  Skin thickening: No  Chest pain or pressure: No  Fast or irregular heartbeat: No  Pain in legs with walking: Yes  Trouble breathing while lying down: No  Fingers or toes appear blue: Yes  High blood pressure: No  Low blood pressure: No  Fainting: No  Murmurs: No  Pacemaker: No  Varicose veins: Yes  Edema or  swelling: Yes  Wake up at night with shortness of breath: Yes  Light-headedness: No  Exercise intolerance: Yes  Heart burn or indigestion: No  Nausea: No  Vomiting: No  Abdominal pain: No  Bloating: No  Constipation: Yes  Diarrhea: No  Blood in stool: No  Black stools: No  Rectal or Anal pain: No  Fecal incontinence: No  Yellowing of skin or eyes: No  Vomit with blood: No  Change in stools: No  Trouble holding urine or incontinence: No  Pain or burning: No  Trouble starting or stopping: No  Increased frequency of urination: Yes  Blood in urine: No  Decreased frequency of urination: No  Frequent nighttime urination: Yes  Flank pain: Yes  Difficulty emptying bladder: No  Bleeding or spotting between periods: No  Heavy or painful periods: No  Irregular periods: No  Vaginal discharge: No  Hot flashes: No  Vaginal dryness: Yes  Genital ulcers: No  Reduced libido: Yes  Difficulty with sexual arousal: Yes  Post-menopausal bleeding: No  Back pain: Yes  Muscle aches: Yes  Neck pain: No  Swollen joints: No  Joint pain: No  Bone pain: No  Muscle cramps: Yes  Muscle weakness: Yes  Joint stiffness: No  Bone fracture: No

## 2021-09-18 ENCOUNTER — HEALTH MAINTENANCE LETTER (OUTPATIENT)
Age: 71
End: 2021-09-18

## 2021-10-18 ENCOUNTER — THERAPY VISIT (OUTPATIENT)
Dept: SLEEP MEDICINE | Facility: CLINIC | Age: 71
End: 2021-10-18
Payer: COMMERCIAL

## 2021-10-18 DIAGNOSIS — G47.10 HYPERSOMNIA: ICD-10-CM

## 2021-10-18 DIAGNOSIS — R51.9 SLEEP RELATED HEADACHES: ICD-10-CM

## 2021-10-18 DIAGNOSIS — R06.83 SNORING: ICD-10-CM

## 2021-10-18 DIAGNOSIS — R06.81 WITNESSED EPISODE OF APNEA: ICD-10-CM

## 2021-10-18 PROCEDURE — 95810 POLYSOM 6/> YRS 4/> PARAM: CPT | Performed by: INTERNAL MEDICINE

## 2021-10-20 LAB — SLPCOMP: NORMAL

## 2021-10-27 ASSESSMENT — SLEEP AND FATIGUE QUESTIONNAIRES
HOW LIKELY ARE YOU TO NOD OFF OR FALL ASLEEP WHILE SITTING AND READING: SLIGHT CHANCE OF DOZING
HOW LIKELY ARE YOU TO NOD OFF OR FALL ASLEEP WHILE SITTING INACTIVE IN A PUBLIC PLACE: WOULD NEVER DOZE
HOW LIKELY ARE YOU TO NOD OFF OR FALL ASLEEP WHILE SITTING QUIETLY AFTER LUNCH WITHOUT ALCOHOL: SLIGHT CHANCE OF DOZING
HOW LIKELY ARE YOU TO NOD OFF OR FALL ASLEEP WHEN YOU ARE A PASSENGER IN A CAR FOR AN HOUR WITHOUT A BREAK: MODERATE CHANCE OF DOZING
HOW LIKELY ARE YOU TO NOD OFF OR FALL ASLEEP WHILE WATCHING TV: SLIGHT CHANCE OF DOZING
HOW LIKELY ARE YOU TO NOD OFF OR FALL ASLEEP WHILE SITTING AND TALKING TO SOMEONE: WOULD NEVER DOZE
HOW LIKELY ARE YOU TO NOD OFF OR FALL ASLEEP WHILE LYING DOWN TO REST IN THE AFTERNOON WHEN CIRCUMSTANCES PERMIT: HIGH CHANCE OF DOZING
HOW LIKELY ARE YOU TO NOD OFF OR FALL ASLEEP IN A CAR, WHILE STOPPED FOR A FEW MINUTES IN TRAFFIC: WOULD NEVER DOZE

## 2021-11-01 ENCOUNTER — VIRTUAL VISIT (OUTPATIENT)
Dept: SLEEP MEDICINE | Facility: CLINIC | Age: 71
End: 2021-11-01
Payer: COMMERCIAL

## 2021-11-01 VITALS — BODY MASS INDEX: 20.66 KG/M2 | WEIGHT: 121 LBS | HEIGHT: 64 IN

## 2021-11-01 DIAGNOSIS — G47.8 UPPER AIRWAY RESISTANCE SYNDROME: Primary | ICD-10-CM

## 2021-11-01 PROCEDURE — 99212 OFFICE O/P EST SF 10 MIN: CPT | Mod: 95 | Performed by: INTERNAL MEDICINE

## 2021-11-01 ASSESSMENT — MIFFLIN-ST. JEOR: SCORE: 1040.91

## 2021-11-01 NOTE — PATIENT INSTRUCTIONS
Your BMI is Body mass index is 21.1 kg/m .  Weight management is a personal decision.  If you are interested in exploring weight loss strategies, the following discussion covers the approaches that may be successful. Body mass index (BMI) is one way to tell whether you are at a healthy weight, overweight, or obese. It measures your weight in relation to your height.  A BMI of 18.5 to 24.9 is in the healthy range. A person with a BMI of 25 to 29.9 is considered overweight, and someone with a BMI of 30 or greater is considered obese. More than two-thirds of American adults are considered overweight or obese.  Being overweight or obese increases the risk for further weight gain. Excess weight may lead to heart disease and diabetes.  Creating and following plans for healthy eating and physical activity may help you improve your health.  Weight control is part of healthy lifestyle and includes exercise, emotional health, and healthy eating habits. Careful eating habits lifelong are the mainstay of weight control. Though there are significant health benefits from weight loss, long-term weight loss with diet alone may be very difficult to achieve- studies show long-term success with dietary management in less than 10% of people. Attaining a healthy weight may be especially difficult to achieve in those with severe obesity. In some cases, medications, devices and surgical management might be considered.  What can you do?  If you are overweight or obese and are interested in methods for weight loss, you should discuss this with your provider.     Consider reducing daily calorie intake by 500 calories.     Keep a food journal.     Avoiding skipping meals, consider cutting portions instead.    Diet combined with exercise helps maintain muscle while optimizing fat loss. Strength training is particularly important for building and maintaining muscle mass. Exercise helps reduce stress, increase energy, and improves fitness.  Increasing exercise without diet control, however, may not burn enough calories to loose weight.       Start walking three days a week 10-20 minutes at a time    Work towards walking thirty minutes five days a week     Eventually, increase the speed of your walking for 1-2 minutes at time    In addition, we recommend that you review healthy lifestyles and methods for weight loss available through the National Institutes of Health patient information sites:  http://win.niddk.nih.gov/publications/index.htm    And look into health and wellness programs that may be available through your health insurance provider, employer, local community center, or srinivas club.    Upper Airway Resistance Syndrome treatment options:    1. Positional therapy- consider getting bricks to put underneath the head of your bed to turn your bed into a wedge. Your aim is to elevated the plane of your bed so you are sleeping with the head of your bed elevated.  2. Muscle strengthening exercises- Consider learning to play a lower octave woodwind instrument such as tuba, saxophone or trumpet for 30 minutes a day for 3 months.  3.Weight loss  4.Mandibular advancement device  5.CPAP

## 2021-11-01 NOTE — PROGRESS NOTES
Nubia is a 71 year old who is being evaluated via a billable video visit.      How would you like to obtain your AVS? MyChart  If the video visit is dropped, the invitation should be resent by: Text to cell phone: [phone]  Will anyone else be joining your video visit? No    Video Start Time: 12:57 PM  Video-Visit Details    Type of service:  Video Visit    Video End Time:1:04 PM    Originating Location (pt. Location): Home    Distant Location (provider location):  [facility]     Platform used for Video Visit: Opsens patient be in the state [facility] at time of video visit: yes     Additional 10 minutes on the date of service was spent performing the following:    -Preparing to see the patient (eg, review of tests)   -Ordering medications, tests, or procedures   -Documenting clinical information in the electronic or other health record     Thank you for the opportunity to participate in the care of Nubia Lee.     She is a 71 year old  female patient who comes to the sleep medicine clinic for review of sleep study results. The study was completed on 10/18/21  which showed that the patient had UARS but did not rule in for SHIREEN.    Assessment and Plan:  In summary Nubia Lee is a 71 year old year old female here for review of sleep study results.  1. Upper airway resistance syndrome  I educated the patient on the underlying pathophysiology of UARS. We discussed the treatment options available including positional therapy, weight loss, muscle strengthening exercises, and mandibular advancement device. If after 3 months of positional therapy, the patient continues to have symptoms, I may consider a repeat sleep study.     Total score - [place]: 8 (10/27/2021  5:53 PM)    Patient Active Problem List   Diagnosis     Personal history of nicotine dependence     Osteopenia     Hypervitaminosis D     Hyperlipidemia     Varicose veins of left lower extremity with other  complications     History of malignant neoplasm of breast     Hypercalcemia     Atherosclerosis of arteries of extremities (H)     Abnormal results of pulmonary function studies     Genital warts     Elevated parathyroid hormone       Current Outpatient Medications   Medication Sig Dispense Refill     AMINO ACIDS COMPLEX PO Take 1 mL by mouth       Ascorbic Acid (VITAMIN C) 100 MG CHEW        aspirin-acetaminophen-caffeine (EXCEDRIN MIGRAINE) 250-250-65 MG tablet Take 1 tablet by mouth       cholecalciferol (VITAMIN D) 200 units (5 mcg) TABS half-tab Take 50 mcg by mouth        co-enzyme Q-10 100 MG CAPS capsule Take 100 mg by mouth daily       COMPRESSION STOCKINGS Please measure and distribute 2 pair of 20mmHg - 30mmHg knee high open or closed toe compression stockings with extra refills as indicated. Jobst ultrasheer or equivalent. 2 each 4     Magnesium Oxide 140 MG CAPS Take 400 mg by mouth       methylphenidate (RITALIN) 10 MG tablet TAKE 1 TO 2 TABLETS BY MOUTH ONCE DAILY       methylphenidate HCl ER (CONCERTA) 18 MG CR tablet TAKE 2 TABLETS BY MOUTH IN THE MORNING       Multiple Vitamins-Minerals (MULTIVITAMIN ADULT EXTRA C PO) Take 2 tablets by mouth       rosuvastatin (CRESTOR) 5 MG tablet Take 1 tablet (5 mg) by mouth daily 90 tablet 3     vortioxetine (TRINTELLIX) 10 MG tablet Take 10 mg by mouth daily         Allergies   Allergen Reactions     Macrodantin [Nitrofurantoin] Rash       Physical Exam:  GEN: NAD  Psych: normal mood, normal affect     Labs/Studies:  - We reviewed the results of the overnight study as described on the HPI.     No results found for: NAVIN      Patient verbalized understanding of these issues, agrees with the plan and all questions were answered today. Patient was given an opportuntity to voice any other symptoms or concerns not listed above. Patient did not have any other symptoms or concerns.      Moreno Mcgee DO  Board Certified in Internal Medicine and Sleep  Medicine      (Note created with Dragon voice recognition and unintended spelling errors and word substitutions may occur)

## 2021-11-13 ENCOUNTER — HEALTH MAINTENANCE LETTER (OUTPATIENT)
Age: 71
End: 2021-11-13

## 2022-08-10 DIAGNOSIS — E78.01 FAMILIAL HYPERCHOLESTEROLEMIA: ICD-10-CM

## 2022-08-15 RX ORDER — ROSUVASTATIN CALCIUM 5 MG/1
TABLET, COATED ORAL
Qty: 30 TABLET | Refills: 0 | Status: SHIPPED | OUTPATIENT
Start: 2022-08-15 | End: 2022-09-06

## 2022-08-20 ENCOUNTER — HEALTH MAINTENANCE LETTER (OUTPATIENT)
Age: 72
End: 2022-08-20

## 2022-08-26 DIAGNOSIS — E78.01 FAMILIAL HYPERCHOLESTEROLEMIA: Primary | ICD-10-CM

## 2022-09-05 DIAGNOSIS — E78.01 FAMILIAL HYPERCHOLESTEROLEMIA: ICD-10-CM

## 2022-09-06 RX ORDER — ROSUVASTATIN CALCIUM 5 MG/1
TABLET, COATED ORAL
Qty: 30 TABLET | Refills: 0 | Status: SHIPPED | OUTPATIENT
Start: 2022-09-06 | End: 2022-10-24

## 2022-09-09 ENCOUNTER — MYC MEDICAL ADVICE (OUTPATIENT)
Dept: FAMILY MEDICINE | Facility: CLINIC | Age: 72
End: 2022-09-09

## 2022-09-09 DIAGNOSIS — E83.52 HYPERCALCEMIA: Primary | ICD-10-CM

## 2022-10-17 DIAGNOSIS — E78.01 FAMILIAL HYPERCHOLESTEROLEMIA: ICD-10-CM

## 2022-10-19 ENCOUNTER — MYC MEDICAL ADVICE (OUTPATIENT)
Dept: OBGYN | Facility: CLINIC | Age: 72
End: 2022-10-19

## 2022-10-24 NOTE — TELEPHONE ENCOUNTER
Rosuvastatin Calcium 5 MG Oral Tablet      Last Written Prescription Date:  9/6/22  Last Fill Quantity: 30,   # refills: 0  Last Office Visit : 6/25/21  Future Office visit:  None scheduled    Routing refill request to provider for review/approval because:  Limited quantity, no refills last prescribed

## 2022-10-25 RX ORDER — ROSUVASTATIN CALCIUM 5 MG/1
5 TABLET, COATED ORAL DAILY
Qty: 30 TABLET | Refills: 0 | Status: SHIPPED | OUTPATIENT
Start: 2022-10-25 | End: 2022-11-28

## 2022-10-25 NOTE — TELEPHONE ENCOUNTER
"Patient requesting rosuvastatin 5mg refill.      Medication was originally prescribed many years ago by outside provider.     Initiated care with Dr. Romero 2/25/2020, notes state to continue rosuvastatin.     Seen in office 6/25/2021 with Dr. Chambers- re-ordered at that time.     Have been refilled by different people each time.     On 9/05/2022 Dr. Gilbert states \"Pt is overdue for a physical and labs.  Please call her to have her schedule.  Limited refill.\"     Patient has lab appointment for 10/28 to have vit D, calcium, parathyroid, hepatic function, thyroid, BMP, and lipid panel.     30 tabs ordered, and patient instructed to call  to schedule appointment.       "

## 2022-10-28 ENCOUNTER — LAB (OUTPATIENT)
Dept: LAB | Facility: CLINIC | Age: 72
End: 2022-10-28
Payer: COMMERCIAL

## 2022-10-28 DIAGNOSIS — E83.52 HYPERCALCEMIA: ICD-10-CM

## 2022-10-28 DIAGNOSIS — E78.01 FAMILIAL HYPERCHOLESTEROLEMIA: ICD-10-CM

## 2022-10-28 LAB
ALBUMIN SERPL-MCNC: 3.6 G/DL (ref 3.4–5)
ALP SERPL-CCNC: 69 U/L (ref 40–150)
ALT SERPL W P-5'-P-CCNC: 32 U/L (ref 0–50)
ANION GAP SERPL CALCULATED.3IONS-SCNC: 6 MMOL/L (ref 3–14)
AST SERPL W P-5'-P-CCNC: 19 U/L (ref 0–45)
BILIRUB DIRECT SERPL-MCNC: 0.2 MG/DL (ref 0–0.2)
BILIRUB SERPL-MCNC: 0.6 MG/DL (ref 0.2–1.3)
BUN SERPL-MCNC: 25 MG/DL (ref 7–30)
CALCIUM SERPL-MCNC: 9.3 MG/DL (ref 8.5–10.1)
CHLORIDE BLD-SCNC: 109 MMOL/L (ref 94–109)
CHOLEST SERPL-MCNC: 198 MG/DL
CO2 SERPL-SCNC: 27 MMOL/L (ref 20–32)
CREAT SERPL-MCNC: 0.61 MG/DL (ref 0.52–1.04)
DEPRECATED CALCIDIOL+CALCIFEROL SERPL-MC: 95 UG/L (ref 20–75)
FASTING STATUS PATIENT QL REPORTED: YES
GFR SERPL CREATININE-BSD FRML MDRD: >90 ML/MIN/1.73M2
GLUCOSE BLD-MCNC: 103 MG/DL (ref 70–99)
HDLC SERPL-MCNC: 118 MG/DL
LDLC SERPL CALC-MCNC: 71 MG/DL
NONHDLC SERPL-MCNC: 80 MG/DL
POTASSIUM BLD-SCNC: 4.2 MMOL/L (ref 3.4–5.3)
PROT SERPL-MCNC: 7.2 G/DL (ref 6.8–8.8)
PTH-INTACT SERPL-MCNC: 40 PG/ML (ref 15–65)
SODIUM SERPL-SCNC: 142 MMOL/L (ref 133–144)
TRIGL SERPL-MCNC: 44 MG/DL
TSH SERPL DL<=0.005 MIU/L-ACNC: 0.92 MU/L (ref 0.4–4)

## 2022-10-28 PROCEDURE — 80053 COMPREHEN METABOLIC PANEL: CPT

## 2022-10-28 PROCEDURE — 82248 BILIRUBIN DIRECT: CPT

## 2022-10-28 PROCEDURE — 84443 ASSAY THYROID STIM HORMONE: CPT

## 2022-10-28 PROCEDURE — 83970 ASSAY OF PARATHORMONE: CPT

## 2022-10-28 PROCEDURE — 80061 LIPID PANEL: CPT

## 2022-10-28 PROCEDURE — 82306 VITAMIN D 25 HYDROXY: CPT

## 2022-10-28 PROCEDURE — 36415 COLL VENOUS BLD VENIPUNCTURE: CPT

## 2022-11-04 ENCOUNTER — TRANSFERRED RECORDS (OUTPATIENT)
Dept: MULTI SPECIALTY CLINIC | Facility: CLINIC | Age: 72
End: 2022-11-04

## 2022-11-19 ENCOUNTER — HEALTH MAINTENANCE LETTER (OUTPATIENT)
Age: 72
End: 2022-11-19

## 2022-11-23 DIAGNOSIS — E78.01 FAMILIAL HYPERCHOLESTEROLEMIA: ICD-10-CM

## 2022-11-28 RX ORDER — ROSUVASTATIN CALCIUM 5 MG/1
TABLET, COATED ORAL
Qty: 30 TABLET | Refills: 0 | Status: SHIPPED | OUTPATIENT
Start: 2022-11-28 | End: 2022-12-07

## 2022-11-28 NOTE — TELEPHONE ENCOUNTER
Refill sent for pt statin, she does have an appointment on 12/7/22 at the [facility]    Saira Alexander RN   [facility]

## 2022-12-04 DIAGNOSIS — E78.01 FAMILIAL HYPERCHOLESTEROLEMIA: ICD-10-CM

## 2022-12-07 ENCOUNTER — OFFICE VISIT (OUTPATIENT)
Dept: FAMILY MEDICINE | Facility: CLINIC | Age: 72
End: 2022-12-07
Payer: COMMERCIAL

## 2022-12-07 VITALS
WEIGHT: 118 LBS | TEMPERATURE: 97.5 F | SYSTOLIC BLOOD PRESSURE: 118 MMHG | RESPIRATION RATE: 14 BRPM | BODY MASS INDEX: 20.91 KG/M2 | HEART RATE: 87 BPM | DIASTOLIC BLOOD PRESSURE: 68 MMHG | OXYGEN SATURATION: 100 % | HEIGHT: 63 IN

## 2022-12-07 DIAGNOSIS — Z12.31 VISIT FOR SCREENING MAMMOGRAM: ICD-10-CM

## 2022-12-07 DIAGNOSIS — Z12.11 SCREEN FOR COLON CANCER: ICD-10-CM

## 2022-12-07 DIAGNOSIS — F90.0 ATTENTION DEFICIT HYPERACTIVITY DISORDER (ADHD), PREDOMINANTLY INATTENTIVE TYPE: ICD-10-CM

## 2022-12-07 DIAGNOSIS — Z00.00 ENCOUNTER FOR MEDICARE ANNUAL WELLNESS EXAM: ICD-10-CM

## 2022-12-07 DIAGNOSIS — Z78.0 POST-MENOPAUSAL: ICD-10-CM

## 2022-12-07 DIAGNOSIS — Z23 NEED FOR VACCINATION: Primary | ICD-10-CM

## 2022-12-07 DIAGNOSIS — E78.01 FAMILIAL HYPERCHOLESTEROLEMIA: ICD-10-CM

## 2022-12-07 PROCEDURE — 90662 IIV NO PRSV INCREASED AG IM: CPT | Performed by: NURSE PRACTITIONER

## 2022-12-07 PROCEDURE — G0438 PPPS, INITIAL VISIT: HCPCS | Performed by: NURSE PRACTITIONER

## 2022-12-07 PROCEDURE — G0008 ADMIN INFLUENZA VIRUS VAC: HCPCS | Performed by: NURSE PRACTITIONER

## 2022-12-07 PROCEDURE — 99214 OFFICE O/P EST MOD 30 MIN: CPT | Mod: 25 | Performed by: NURSE PRACTITIONER

## 2022-12-07 RX ORDER — ROSUVASTATIN CALCIUM 5 MG/1
5 TABLET, COATED ORAL DAILY
Qty: 90 TABLET | Refills: 3 | Status: SHIPPED | OUTPATIENT
Start: 2022-12-07 | End: 2023-12-04

## 2022-12-07 ASSESSMENT — ENCOUNTER SYMPTOMS
DYSPHORIC MOOD: 0
CHILLS: 0
WEAKNESS: 0
HEADACHES: 1
FATIGUE: 0
NERVOUS/ANXIOUS: 0
SHORTNESS OF BREATH: 0
SORE THROAT: 0
DYSURIA: 0
CONSTIPATION: 1
ABDOMINAL DISTENTION: 0
JOINT SWELLING: 0
EYE PAIN: 0
PALPITATIONS: 0
HEMATOCHEZIA: 0
NAUSEA: 0
COUGH: 0
HEARTBURN: 0
MYALGIAS: 1
LIGHT-HEADEDNESS: 0
ABDOMINAL PAIN: 0
BREAST MASS: 0
ARTHRALGIAS: 0
HEMATURIA: 0
PARESTHESIAS: 0
SLEEP DISTURBANCE: 0
DIARRHEA: 0
VOMITING: 0
RHINORRHEA: 0
CHEST TIGHTNESS: 0
NUMBNESS: 0
DIZZINESS: 0
FREQUENCY: 0
FEVER: 0
WHEEZING: 0

## 2022-12-07 ASSESSMENT — PAIN SCALES - GENERAL: PAINLEVEL: NO PAIN (0)

## 2022-12-07 ASSESSMENT — ACTIVITIES OF DAILY LIVING (ADL): CURRENT_FUNCTION: NO ASSISTANCE NEEDED

## 2022-12-07 NOTE — PATIENT INSTRUCTIONS
Please try and take 1200 mg of calcium carbonate per day.     You can call imaging scheduling to set up appointment date, time, and location that works best for you to have bone density test [phone]      Patient Education   Personalized Prevention Plan  You are due for the preventive services outlined below.  Your care team is available to assist you in scheduling these services.  If you have already completed any of these items, please share that information with your care team to update in your medical record.  Health Maintenance Due   Topic Date Due    ANNUAL REVIEW OF HM ORDERS  Never done    LUNG CANCER SCREENING  Never done    Mammogram  08/21/2021    PHQ-2 (once per calendar year)  01/01/2022    COVID-19 Vaccine (4 - Booster for Pfizer series) 02/17/2022    FALL RISK ASSESSMENT  06/25/2022    Colorectal Cancer Screening  06/30/2022    Flu Vaccine (1) 09/01/2022     Preventive Health Recommendations    See your health care provider every year to  Review health changes.   Discuss preventive care.    Review your medicines if your doctor has prescribed any.  You no longer need a yearly Pap test unless you've had an abnormal Pap test in the past 10 years. If you have vaginal symptoms, such as bleeding or discharge, be sure to talk with your provider about a Pap test.  Every 1 to 2 years, have a mammogram.  If you are over 69, talk with your health care provider about whether or not you want to continue having screening mammograms.  Every 10 years, have a colonoscopy. Or, have a yearly FIT test (stool test). These exams will check for colon cancer.   Have a cholesterol test every 5 years, or more often if your doctor advises it.   Have a diabetes test (fasting glucose) every three years. If you are at risk for diabetes, you should have this test more often.   At age 65, have a bone density scan (DEXA) to check for osteoporosis (brittle bone disease).    Shots:  Get a flu shot each year.  Get a tetanus shot  every 10 years.  Talk to your doctor about your pneumonia vaccines. There are now two you should receive - Pneumovax (PPSV 23) and Prevnar (PCV 13).  Talk to your pharmacist about the shingles vaccine.  Talk to your doctor about the hepatitis B vaccine.    Nutrition:   Eat at least 5 servings of fruits and vegetables each day.  Eat whole-grain bread, whole-wheat pasta and brown rice instead of white grains and rice.  Get adequate Calcium and Vitamin D.     Lifestyle  Exercise at least 150 minutes a week (30 minutes a day, 5 days a week). This will help you control your weight and prevent disease.  Limit alcohol to one drink per day.  No smoking.   Wear sunscreen to prevent skin cancer.   See your dentist twice a year for an exam and cleaning.  See your eye doctor every 1 to 2 years to screen for conditions such as glaucoma, macular degeneration and cataracts.    Personalized Prevention Plan  You are due for the preventive services outlined below.  Your care team is available to assist you in scheduling these services.  If you have already completed any of these items, please share that information with your care team to update in your medical record.  Health Maintenance   Topic Date Due    ANNUAL REVIEW OF HM ORDERS  Never done    LUNG CANCER SCREENING  Never done    MAMMO SCREENING  08/21/2021    PHQ-2 (once per calendar year)  01/01/2022    COVID-19 Vaccine (4 - Booster for Pfizer series) 02/17/2022    FALL RISK ASSESSMENT  06/25/2022    COLORECTAL CANCER SCREENING  06/30/2022    INFLUENZA VACCINE (1) 09/01/2022    DEXA  10/24/2023    MEDICARE ANNUAL WELLNESS VISIT  12/07/2023    ADVANCE CARE PLANNING  06/27/2026    LIPID  10/28/2027    DTAP/TDAP/TD IMMUNIZATION (3 - Td or Tdap) 10/11/2029    HEPATITIS C SCREENING  Completed    Pneumococcal Vaccine: 65+ Years  Completed    ZOSTER IMMUNIZATION  Completed    IPV IMMUNIZATION  Aged Out    MENINGITIS IMMUNIZATION  Aged Out

## 2022-12-07 NOTE — PROGRESS NOTES
"SUBJECTIVE:   Nubia is a 72 year old who presents for Preventive Visit.  Patient has been advised of split billing requirements and indicates understanding: Yes  Are you in the first 12 months of your Medicare coverage?  No    Healthy Habits:     In general, how would you rate your overall health?  Good    Frequency of exercise:  1 day/week    Duration of exercise:  15-30 minutes    Do you usually eat at least 4 servings of fruit and vegetables a day, include whole grains    & fiber and avoid regularly eating high fat or \"junk\" foods?  Yes    Taking medications regularly:  Yes    Medication side effects:  No significant flushing and Muscle aches    Ability to successfully perform activities of daily living:  No assistance needed    Home Safety:  No safety concerns identified    Hearing Impairment:  No hearing concerns    In the past 6 months, have you been bothered by leaking of urine?  No    In general, how would you rate your overall mental or emotional health?  Good      PHQ-2 Total Score: 0    Additional concerns today:  Yes      Have you ever done Advance Care Planning? (For example, a Health Directive, POLST, or a discussion with a medical provider or your loved ones about your wishes): Yes, advance care planning is on file.    Fall risk  Fallen 2 or more times in the past year?: No  Any fall with injury in the past year?: No     Cognitive Screening   1) Repeat 3 items (Leader, Season, Table)    2) Clock draw: NORMAL  3) 3 item recall: Recalls 3 objects  Results: NORMAL clock, 1-2 items recalled: COGNITIVE IMPAIRMENT LESS LIKELY    Mini-CogTM Copyright JEANCARLOS Nicole. Licensed by the author for use in [facility]; reprinted with permission (gloria@.[facility]). All rights reserved.        Reviewed and updated as needed this visit by clinical staff   Tobacco  Allergies  Meds           Radha Helms CMA    Reviewed and updated as needed this visit by Provider                 Social History     Tobacco Use "     Smoking status: Former     Types: Cigarettes     Quit date:      Years since quittin.9     Smokeless tobacco: Never   Substance Use Topics     Alcohol use: Yes     If you drink alcohol do you typically have >3 drinks per day or >7 drinks per week? No      Current providers sharing in care for this patient include:   Patient Care Team:  Lindsey Romero APRN CNP as PCP - General (Nurse Practitioner)  Cassandra Chambers MD as MD (Internal Medicine)  Cassandra Chambers MD as Assigned PCP  Moreno Mcgee DO as Assigned Sleep Provider    The following health maintenance items are reviewed in Epic and correct as of today:  Health Maintenance   Topic Date Due     COVID-19 Vaccine (4 - Booster for Pfizer series) 2022     COLORECTAL CANCER SCREENING  2022     MAMMO SCREENING  2023 (Originally 2021)     DEXA  10/24/2023     MEDICARE ANNUAL WELLNESS VISIT  2023     ANNUAL REVIEW OF HM ORDERS  2023     FALL RISK ASSESSMENT  2023     LIPID  10/28/2027     ADVANCE CARE PLANNING  2027     DTAP/TDAP/TD IMMUNIZATION (3 - Td or Tdap) 10/11/2029     HEPATITIS C SCREENING  Completed     PHQ-2 (once per calendar year)  Completed     INFLUENZA VACCINE  Completed     Pneumococcal Vaccine: 65+ Years  Completed     ZOSTER IMMUNIZATION  Completed     IPV IMMUNIZATION  Aged Out     MENINGITIS IMMUNIZATION  Aged Out     BP Readings from Last 3 Encounters:   22 118/68   21 115/74   21 134/73    Wt Readings from Last 3 Encounters:   22 53.5 kg (118 lb)   21 54.9 kg (121 lb)   21 54.9 kg (121 lb)             Here today for physical and to establish care.  Is a retired psychiatric nurse.  Previous provider retired, had been receiving care downtown.  Does not want to continue to drive to cities.  Needs to have refill of Crestor/rosuvastatin.  Has been taking and tolerating well.  No negative side effects.    Last vitamin D test was  elevated.  Stopped taking additional, supplemental vitamin D and now is only taking vitamin D that is in multivitamin.    Has had right breast cancer in the past. Had lump ectomy. Chemo and radiation at that time as well. This was in 2000.    Was having double vision.  Made eye appointment.  Eyes changed shape.  Ophthalmologist performed prism lenses, this has been very helpful.    History of attention deficit disorder.  Takes Concerta and methylphenidate.  Previous psychiatrist retired.  Plans to establish care with new provider in same clinic.  Would like to continue to take them.    Has rash behind bilateral knees.  Thinks may be psoriasis.  Concerned that may have psoriatic arthritis-having knee pain.  Plans to make appointment with dermatology.    Last Pap: February 2020-normal, negative HPV  Last mammogram: Nov 4th, 2022-[facility]. No other family memenbers with breast cancer   Last BMD: 2008  Last Colonoscopy: maternal great grand mother  Last eye exam: fall 2022  Last dental exam: every 6 mo  Last tetanus vaccine: 2019  Last influenza vaccine: will do today  Last shingles vaccine: done   Last pneumonia vaccine: has had both doses   Last COVID vaccine: has had both doses  Last COVID booster: Plans to at pharmacy  Hep C screen: Done 10, 10/2019  HIV screen: Not applicable-low risk  AAA screen (age 65-78 with smoking hx):  IVD (HTN, Hyperlipid, Smoking): Not applicable  Lung CA screening (50-77, 20 pk smoking hx) Quit within 15 years: Quit smoking in 1995      Review of Systems   Constitutional: Negative for chills, fatigue and fever.   HENT: Negative for congestion, ear pain, hearing loss, rhinorrhea and sore throat.    Eyes: Positive for visual disturbance. Negative for pain.   Respiratory: Negative for cough, chest tightness, shortness of breath and wheezing.    Cardiovascular: Negative for chest pain, palpitations and peripheral edema.   Gastrointestinal: Positive for constipation. Negative  "for abdominal distention, abdominal pain, diarrhea, heartburn, hematochezia, nausea and vomiting.   Endocrine: Negative for cold intolerance and heat intolerance.   Breasts:  Negative for tenderness, breast mass and discharge.   Genitourinary: Negative for dysuria, frequency, genital sores, hematuria, pelvic pain, urgency, vaginal bleeding and vaginal discharge.   Musculoskeletal: Positive for myalgias. Negative for arthralgias and joint swelling.   Skin: Positive for rash.   Neurological: Negative for dizziness, weakness, light-headedness, numbness and paresthesias.   Psychiatric/Behavioral: Negative for dysphoric mood, mood changes and sleep disturbance. The patient is not nervous/anxious.          OBJECTIVE:   /68   Pulse 87   Temp 97.5  F (36.4  C) (Tympanic)   Resp 14   Ht 1.588 m (5' 2.5\")   Wt 53.5 kg (118 lb)   SpO2 100%   BMI 21.24 kg/m   Estimated body mass index is 21.24 kg/m  as calculated from the following:    Height as of this encounter: 1.588 m (5' 2.5\").    Weight as of this encounter: 53.5 kg (118 lb).  Physical Exam  Constitutional:       Appearance: Normal appearance. She is well-developed and well-nourished.   HENT:      Head: Normocephalic and atraumatic.      Right Ear: Tympanic membrane and external ear normal. No middle ear effusion.      Left Ear: Tympanic membrane and external ear normal.  No middle ear effusion.      Nose: No mucosal edema.      Mouth/Throat:      Mouth: Oropharynx is clear and moist and mucous membranes are normal.   Neck:      Thyroid: No thyromegaly.      Vascular: No carotid bruit.   Cardiovascular:      Rate and Rhythm: Normal rate and regular rhythm.      Heart sounds: Normal heart sounds.   Pulmonary:      Effort: Pulmonary effort is normal.      Breath sounds: Normal breath sounds.   Abdominal:      General: Bowel sounds are normal.      Palpations: Abdomen is soft.   Musculoskeletal:         General: No edema.   Skin:     General: Skin is warm and " dry.   Neurological:      Mental Status: She is alert.   Psychiatric:         Mood and Affect: Mood and affect normal.         Behavior: Behavior normal.         ASSESSMENT / PLAN:   Encounter for Medicare annual wellness exam  Screening guidelines reviewed.   Healthcare directive and POLST form given.  Plans to review.  Declines referral at this time to honoring choices but would be happy to place in the future if desires.    Visit for screening mammogram  Mammogram currently up-to-date-goes to [facility] annually.    Screen for colon cancer  Declines colonoscopy-is agreeable to Cologuard  - COLOGUARD([employer]); Future    Need for vaccination  Administered today in clinic  - INFLUENZA, QUAD, HIGH DOSE, PF, 65YR + (FLUZONE HD)    Familial hypercholesterolemia  Previous lipids reviewed.  Tolerating rosuvastatin well.  Refills given.  Plan to follow-up in 1 year.  - rosuvastatin (CRESTOR) 5 MG tablet; Take 1 tablet (5 mg) by mouth daily    Post-menopausal  Order placed, plans to call per self and set up  - DX Hip/Pelvis/Spine; Future    Attention deficit hyperactivity disorder (ADHD), predominantly inattentive type  Recommend decreasing dose/titrating off stimulants.  Continue following with psychiatry.    Patient has been advised of split billing requirements and indicates understanding: Yes      COUNSELING:  Reviewed preventive health counseling, as reflected in patient instructions       Regular exercise       Healthy diet/nutrition       Vision screening       Dental care       Immunizations    Vaccinated for: Influenza             Osteoporosis prevention/bone health       Colon cancer screening       Hepatitis C screening       HIV screening for high risk patient       Advanced Planning         She reports that she quit smoking about 27 years ago. She has never used smokeless tobacco.      Appropriate preventive services were discussed with this patient, including applicable screening as  appropriate for cardiovascular disease, diabetes, osteopenia/osteoporosis, and glaucoma.  As appropriate for age/gender, discussed screening for colorectal cancer, prostate cancer, breast cancer, and cervical cancer. Checklist reviewing preventive services available has been given to the patient.    Reviewed patients plan of care and provided an AVS. The Basic Care Plan (routine screening as documented in Health Maintenance) for Nubia meets the Care Plan requirement. This Care Plan has been established and reviewed with the Patient.      AREN [facility] RAMONE    Identified Health Risks:

## 2022-12-12 ENCOUNTER — MYC MEDICAL ADVICE (OUTPATIENT)
Dept: FAMILY MEDICINE | Facility: CLINIC | Age: 72
End: 2022-12-12

## 2022-12-12 NOTE — TELEPHONE ENCOUNTER
Disp Refills Start End SUASNA   rosuvastatin (CRESTOR) 5 MG tablet 90 tablet 3 12/7/2022  No   Sig - Route: Take 1 tablet (5 mg) by mouth daily - Oral   Sent to pharmacy as: Rosuvastatin Calcium 5 MG Oral Tablet (CRESTOR)   Class: E-Prescribe   Order: [id number]   E-Prescribing Status: Receipt confirmed by pharmacy (12/7/2022  5:04 PM CST)     Pharmacy    [facility] [address] [place] [address], [place] - [id number]     ....    RN called to determine status of script. Per pharmacy staff: Camille, They have this available.     Refill can go through earliest: 12/20/22 per insurance.     ....    RN updated patient with information. Patient verbalized acknowledgement.       Ronda Martin RN on 12/12/2022 at 2:31 PM

## 2022-12-13 ENCOUNTER — ANCILLARY PROCEDURE (OUTPATIENT)
Dept: BONE DENSITY | Facility: CLINIC | Age: 72
End: 2022-12-13
Attending: NURSE PRACTITIONER
Payer: COMMERCIAL

## 2022-12-13 DIAGNOSIS — Z78.0 POST-MENOPAUSAL: ICD-10-CM

## 2022-12-13 PROCEDURE — 77080 DXA BONE DENSITY AXIAL: CPT | Performed by: INTERNAL MEDICINE

## 2022-12-19 DIAGNOSIS — R19.5 POSITIVE COLORECTAL CANCER SCREENING USING COLOGUARD TEST: Primary | ICD-10-CM

## 2022-12-19 LAB — NONINV COLON CA DNA+OCC BLD SCRN STL QL: POSITIVE

## 2022-12-20 NOTE — RESULT ENCOUNTER NOTE
Nubia,  Your bones are starting to become thin. If you smoke please try and stop. You need to start taking 1200 mg of Calcium + D daily. Please try and do low impact exercises such as light weight lifting. Walking is also another great exercise to help increase bone strength. We will need to recheck your Bone Mineral Density in 2 years.     Coral SEARSC

## 2022-12-30 ENCOUNTER — TELEPHONE (OUTPATIENT)
Dept: GASTROENTEROLOGY | Facility: CLINIC | Age: 72
End: 2022-12-30

## 2022-12-30 DIAGNOSIS — R19.5 POSITIVE COLORECTAL CANCER SCREENING USING COLOGUARD TEST: Primary | ICD-10-CM

## 2022-12-30 RX ORDER — BISACODYL 5 MG
TABLET, DELAYED RELEASE (ENTERIC COATED) ORAL
Qty: 4 TABLET | Refills: 0 | Status: SHIPPED | OUTPATIENT
Start: 2022-12-30 | End: 2023-02-02

## 2022-12-30 NOTE — TELEPHONE ENCOUNTER
Screening Questions  BLUE  KIND OF PREP RED  LOCATION [review exclusion criteria] GREEN  SEDATION TYPE        Y Are you active on mychart?       JUANPABLO MEDRANO Ordering/Referring Provider?        UCARE MEDICARE What type of coverage do you have?      N Have you had a positive covid test in the last 14 days?     21.2 1. BMI  [BMI 40+ - review exclusion criteria]    Y  2. Are you able to give consent for your medical care? [IF NO,RN REVIEW]        N  3. Are you taking any prescription pain medications on a routine schedule?        3a. EXTENDED PREP What kind of prescription?     N 4. Do you have any chemical dependencies such as alcohol, street drugs, or methadone?    N 5. Do you have any history of post-traumatic stress syndrome, severe anxiety or history of psychosis?      **If yes 3- 5 , please schedule with MAC sedation.**          IF YES TO ANY 6 - 10 - HOSPITAL SETTING ONLY.     N 6.   Do you need assistance transferring?     N 7.   Have you had a heart or lung transplant?    N 8.   Are you currently on dialysis?   N 9.   Do you use daily home oxygen?   N 10. Do you take nitroglycerin?   10a.  If yes, how often?     11. [FEMALES]  N Are you currently pregnant?    11a.  If yes, how many weeks? [ Greater than 12 weeks, OR NEEDED]    N 12. Do you have Pulmonary Hypertension? *NEED PAC APPT AT [facility]*     N 13. [review exclusion criteria]  Do you have any implantable devices in your body (pacemaker, defib, LVAD)?    N 14. In the past 6 months, have you had any heart related issues including cardiomyopathy or heart attack?     14a.  If yes, did it require cardiac stenting if so when?     N 15. Have you had a stroke or Transient ischemic attack (TIA - aka  mini stroke ) within 6 months?      N 16. Do you have mod to severe Obstructive Sleep Apnea?  [Hospital only - Ok at [place]]    N 17. Do you have SEVERE AND UNCONTROLLED asthma? *NEED PAC APPT AT [facility]*     N 18. Are you currently taking any blood  "thinners?     18a. If yes, inform patient to \"follow up w/ ordering provider for bridging instructions.\"    N 19. Do you take the medication Phentermine?    19a. If yes, \"Hold for 7 days before procedure.  Please consult your prescribing provider if you have questions about holding this medication.\"     N  20. Do you have chronic kidney disease?      N  21. Do you have a diagnosis of diabetes?     N  22. On a regular basis do you go 3-5 days between bowel movements?      23. Preferred LOCAL Pharmacy for Pre Prescription    [ LIST ONLY ONE PHARMACY]     [facility] [address] [facility] [address]        - CLOSING REMINDERS -    Informed patient they will need an adult    Cannot take any type of public or medical transportation alone    Conscious Sedation- Needs  for 6 hours after the procedure       MAC/General-Needs  for 24 hours after procedure    Pre-Procedure Covid test to be completed [[facility] PCR Testing Required]    Confirmed Nurse will call to complete assessment       - SCHEDULING DETAILS -  no Hospital Setting Required? If yes, what is the exclusion?:    MAY  Surgeon    2/2/2023  Date of Procedure  Lower Endoscopy [Colonoscopy]  [facility]   [facility]ARD GOLYTELY-If you answer yes to questions #8, #20, #21Which Colonoscopy Prep was Sent?     MAC Sedation Type     NO PAC / Pre-op Required                 "

## 2023-01-30 RX ORDER — DESIPRAMINE HYDROCHLORIDE 50 MG/1
75 TABLET ORAL
COMMUNITY
Start: 2022-12-08

## 2023-02-01 ENCOUNTER — ANESTHESIA EVENT (OUTPATIENT)
Dept: SURGERY | Facility: AMBULATORY SURGERY CENTER | Age: 73
End: 2023-02-01
Payer: COMMERCIAL

## 2023-02-02 ENCOUNTER — HOSPITAL ENCOUNTER (OUTPATIENT)
Facility: AMBULATORY SURGERY CENTER | Age: 73
Discharge: HOME | End: 2023-02-02
Attending: FAMILY MEDICINE
Payer: COMMERCIAL

## 2023-02-02 ENCOUNTER — ANESTHESIA (OUTPATIENT)
Dept: SURGERY | Facility: AMBULATORY SURGERY CENTER | Age: 73
End: 2023-02-02
Payer: COMMERCIAL

## 2023-02-02 VITALS
HEART RATE: 78 BPM | SYSTOLIC BLOOD PRESSURE: 140 MMHG | RESPIRATION RATE: 16 BRPM | DIASTOLIC BLOOD PRESSURE: 66 MMHG | TEMPERATURE: 97.7 F | BODY MASS INDEX: 20.73 KG/M2 | OXYGEN SATURATION: 98 % | HEIGHT: 63 IN | WEIGHT: 117 LBS

## 2023-02-02 DIAGNOSIS — R19.5 POSITIVE COLORECTAL CANCER SCREENING USING COLOGUARD TEST: ICD-10-CM

## 2023-02-02 LAB — COLONOSCOPY: NORMAL

## 2023-02-02 PROCEDURE — 45380 COLONOSCOPY AND BIOPSY: CPT | Performed by: FAMILY MEDICINE

## 2023-02-02 RX ORDER — OXYCODONE HYDROCHLORIDE 5 MG/1
5 TABLET ORAL EVERY 4 HOURS PRN
Status: DISCONTINUED | OUTPATIENT
Start: 2023-02-02 | End: 2023-02-03 | Stop reason: HOSPADM

## 2023-02-02 RX ORDER — LIDOCAINE HYDROCHLORIDE 10 MG/ML
INJECTION, SOLUTION INFILTRATION; PERINEURAL PRN
Status: DISCONTINUED | OUTPATIENT
Start: 2023-02-02 | End: 2023-02-02

## 2023-02-02 RX ORDER — ONDANSETRON 4 MG/1
4 TABLET, ORALLY DISINTEGRATING ORAL EVERY 30 MIN PRN
Status: DISCONTINUED | OUTPATIENT
Start: 2023-02-02 | End: 2023-02-03 | Stop reason: HOSPADM

## 2023-02-02 RX ORDER — ONDANSETRON 2 MG/ML
INJECTION INTRAMUSCULAR; INTRAVENOUS PRN
Status: DISCONTINUED | OUTPATIENT
Start: 2023-02-02 | End: 2023-02-02

## 2023-02-02 RX ORDER — HYDROMORPHONE HCL IN WATER/PF 6 MG/30 ML
0.2 PATIENT CONTROLLED ANALGESIA SYRINGE INTRAVENOUS EVERY 5 MIN PRN
Status: DISCONTINUED | OUTPATIENT
Start: 2023-02-02 | End: 2023-02-03 | Stop reason: HOSPADM

## 2023-02-02 RX ORDER — PROPOFOL 10 MG/ML
INJECTION, EMULSION INTRAVENOUS PRN
Status: DISCONTINUED | OUTPATIENT
Start: 2023-02-02 | End: 2023-02-02

## 2023-02-02 RX ORDER — FENTANYL CITRATE 0.05 MG/ML
50 INJECTION, SOLUTION INTRAMUSCULAR; INTRAVENOUS EVERY 5 MIN PRN
Status: DISCONTINUED | OUTPATIENT
Start: 2023-02-02 | End: 2023-02-03 | Stop reason: HOSPADM

## 2023-02-02 RX ORDER — SODIUM CHLORIDE, SODIUM LACTATE, POTASSIUM CHLORIDE, CALCIUM CHLORIDE 600; 310; 30; 20 MG/100ML; MG/100ML; MG/100ML; MG/100ML
INJECTION, SOLUTION INTRAVENOUS CONTINUOUS
Status: DISCONTINUED | OUTPATIENT
Start: 2023-02-02 | End: 2023-02-03 | Stop reason: HOSPADM

## 2023-02-02 RX ORDER — OXYCODONE HYDROCHLORIDE 10 MG/1
10 TABLET ORAL EVERY 4 HOURS PRN
Status: DISCONTINUED | OUTPATIENT
Start: 2023-02-02 | End: 2023-02-03 | Stop reason: HOSPADM

## 2023-02-02 RX ORDER — LIDOCAINE 40 MG/G
CREAM TOPICAL
Status: DISCONTINUED | OUTPATIENT
Start: 2023-02-02 | End: 2023-02-03 | Stop reason: HOSPADM

## 2023-02-02 RX ORDER — ONDANSETRON 2 MG/ML
4 INJECTION INTRAMUSCULAR; INTRAVENOUS EVERY 30 MIN PRN
Status: DISCONTINUED | OUTPATIENT
Start: 2023-02-02 | End: 2023-02-03 | Stop reason: HOSPADM

## 2023-02-02 RX ORDER — FENTANYL CITRATE 0.05 MG/ML
25 INJECTION, SOLUTION INTRAMUSCULAR; INTRAVENOUS EVERY 5 MIN PRN
Status: DISCONTINUED | OUTPATIENT
Start: 2023-02-02 | End: 2023-02-03 | Stop reason: HOSPADM

## 2023-02-02 RX ORDER — ACETAMINOPHEN 325 MG/1
975 TABLET ORAL ONCE
Status: DISCONTINUED | OUTPATIENT
Start: 2023-02-02 | End: 2023-02-03 | Stop reason: HOSPADM

## 2023-02-02 RX ORDER — HYDROMORPHONE HCL IN WATER/PF 6 MG/30 ML
0.4 PATIENT CONTROLLED ANALGESIA SYRINGE INTRAVENOUS EVERY 5 MIN PRN
Status: DISCONTINUED | OUTPATIENT
Start: 2023-02-02 | End: 2023-02-03 | Stop reason: HOSPADM

## 2023-02-02 RX ORDER — PROPOFOL 10 MG/ML
INJECTION, EMULSION INTRAVENOUS CONTINUOUS PRN
Status: DISCONTINUED | OUTPATIENT
Start: 2023-02-02 | End: 2023-02-02

## 2023-02-02 RX ORDER — FENTANYL CITRATE 0.05 MG/ML
25 INJECTION, SOLUTION INTRAMUSCULAR; INTRAVENOUS
Status: DISCONTINUED | OUTPATIENT
Start: 2023-02-02 | End: 2023-02-03 | Stop reason: HOSPADM

## 2023-02-02 RX ADMIN — PROPOFOL 30 MG: 10 INJECTION, EMULSION INTRAVENOUS at 10:18

## 2023-02-02 RX ADMIN — LIDOCAINE HYDROCHLORIDE 3 ML: 10 INJECTION, SOLUTION INFILTRATION; PERINEURAL at 09:58

## 2023-02-02 RX ADMIN — SODIUM CHLORIDE, SODIUM LACTATE, POTASSIUM CHLORIDE, CALCIUM CHLORIDE: 600; 310; 30; 20 INJECTION, SOLUTION INTRAVENOUS at 09:33

## 2023-02-02 RX ADMIN — ONDANSETRON 4 MG: 2 INJECTION INTRAMUSCULAR; INTRAVENOUS at 09:59

## 2023-02-02 RX ADMIN — PROPOFOL 180 MCG/KG/MIN: 10 INJECTION, EMULSION INTRAVENOUS at 09:58

## 2023-02-02 NOTE — ANESTHESIA POSTPROCEDURE EVALUATION
Patient: Nubia Lee    Procedure: Procedure(s):  COLONOSCOPY WITH BIOPSIES       Anesthesia Type:  MAC    Note:  Disposition: Outpatient   Postop Pain Control: Uneventful            Sign Out: Well controlled pain   PONV: No   Neuro/Psych: Uneventful            Sign Out: Acceptable/Baseline neuro status   Airway/Respiratory: Uneventful            Sign Out: Acceptable/Baseline resp. status   CV/Hemodynamics: Uneventful            Sign Out: Acceptable CV status; No obvious hypovolemia; No obvious fluid overload   Other NRE: NONE   DID A NON-ROUTINE EVENT OCCUR? No           Last vitals:  Vitals Value Taken Time   /63 02/02/23 1045   Temp 97.7  F (36.5  C) 02/02/23 1040   Pulse 83 02/02/23 1056   Resp 16 02/02/23 1040   SpO2 97 % 02/02/23 1056   Vitals shown include unvalidated device data.    Electronically Signed By: Steve Dennis MD  February 2, 2023  10:59 AM

## 2023-02-02 NOTE — OP NOTE
COLONOSCOPY OPERATION REPORT     OPERATION PERFORMED:  Colonoscopy with MAC  DATE OF OPERATION: 2023  SURGEON(S): Mejia Beebe III, MD, FAAFP    Preoperative Diagnosis: Positive FIT/DNA    Postoperative Diagnosis: Polyps of the Colon, diverticulosis, internal hemorrhoids    History:    73yof here for colonoscopy after positive Cologuard. The two years prior, she had negative FIT tests. Last colonoscopy was in  and was normal. Denies symptoms. No family history of colon cancer.    Had twins girls in her late 40s via .        Shortly Before Procedure  Time out was completed. Correct patient ID, procedure verified, positioning verified, implants/equipment available, studies available as needed. Consents signed and on the chart. Emergency resuscitation equipment available if needed. TYPE OF ANESTHESIA:  MAC. Patient monitored in the usual fashion with pulse ox, NIBP, and EKG. See flowsheet for full details.      DESCRIPTION OF OPERATION:  Assuring patient comfort, a rectal exam revealed normal sphincter tone, no masses, no stool in the rectal vault. The video colonoscope was passed from anus to cecum under direct visualization with expected amount of difficulty. The cecum was identified by the ileocecal valve, appendiceal orifice, and crows foot. Mucosa was inspected > 6 minute scope withdrawal.         Findings   Prep:    Excellent   EBL:                     < 5 mL   Terminal Ileum: Normal appearance   Cecum:                     Normal appearance.   Ascending Colon:    Normal appearance.       Transverse Colon:   Normal appearance.        Desc. Colon: Normal appearance.       Sigmoid Colon:        Two polyps <5mm removed by cold bx forceps.  Moderate diverticulosis.   Rectum:                    Moderate internal hemorrhoids       Complications: None.     Polyps submitted:                     2   Diverticulosis:    Moderate   Internal Hemorrhoids: Moderate   External Hemorrhoids: None        TIMES time  min   PROCEDURE START 1005 TOTAL PROCEDURE TIME 28   CECUM REACHED 1014 TIME TO CECUM 9   PROCEDURE STOP 1033 WITHDRAWAL TIME 19       DISPOSITION   Follow up/Repeat Colonoscopy:                     Pending pathology review.             Mejia Beebe III, MD, FAAFP

## 2023-02-02 NOTE — DISCHARGE INSTRUCTIONS
Colonoscopy Discharge Instructions  When You Leave Today:  The medications you received today may affect your short-term memory, balance/coordination, and reflexes. It may cause drowsiness, slow reflexes, and impaired thoughts. We recommend that you go home and rest for the remainder of the day. Do NOT DRIVE, go back to work or do anything that requires a clear thought process. Examples include but are not limited to: Do NOT drive, operate any machines, make important personal or work decisions, or sign legal documentation for 24 hours.      You may feel bloated because of the air that was introduced during the exam. Flatulence and belching is expected. If you feel like the air isn't coming out easily you can try laying on your right side to help the air move in the correct direction. Holding in the air can cause abdominal pain, cramping, and in some cases nausea. Let the gas pass!!    You may eat and drink what you can tolerate after your exam.  We recommend that you start with something light and progress as tolerated. You will be gassy, so avoiding things that create more gas is beneficial. This includes things like beans, carbonated beverages, and whole vegetables like broccoli/brussel sprouts/cabbage.     AVOID alcohol for 24 hours. This could have an adverse reaction mixed with the sedative.       You may resume your normal home medications unless told otherwise. Do not double up on any missed doses.        Avoid Aspirin and Aspirin containing products for 3 days if biopsies were taken. This will allow the areas to heal and will decrease your risk of bleeding.      Watch the area where your IV was inserted. If there is any swelling, severe pain or the area feels hot, place a warm, wet cloth over the area for 10-20 minutes. A small bruise is normal. If you continue to have discomfort, contact your doctor.      If you use a CPAP at home, please use it for the next 24-48 hours.    If any of the  following occur, please go to your CLOSEST EMERGENCY ROOM.    Increasing abdominal pain (if you are unable to pass gas or doubled over in pain)  Fever of 101.5 degrees or higher  Bleeding (a small amount of bleeding is normal when wiping if you have hemorrhoids or we remove tissue samples. However if you are filling the toilet with bright red blood, this is an emergency)    Call the [facility] [phone] if you have any questions or concerns regarding your procedure.  The [facility] is open from 8am to 5pm Monday through Friday.  DO not come to the clinic for severe post procedure complications, go to your closest ER.     Colonoscopy:    X Biopsies Taken    Normal- Follow up__________________________ (unless condition changes or treatment guidelines change)   X Diverticulosis   X Hemorrhoids X Internal  External   X High Fiber Diet           Other _______________________________________________________________    If biopsies were taken:  A letter will be mailed to you with your biopsy results and further recommendations in approximately   1-2 weeks.  If you have not received a letter within 3 weeks, please call the [facility] [phone].

## 2023-02-02 NOTE — LETTER
23      Nubia Lee  [address] [place] [place] [address]    : 1950    Nubia Lee,    The results from your colonoscopy showed one precancerous polyp. Based on these results, current guidelines recommend repeat exam in 7-10 years for surveillance colonoscopy (in the years 9122-4708).    Depending on the your health when his next exam would come due, it is possible that you may not benefit from repeat colon cancer screening. I recommend a discussion with your primary care provider at that time to determine the best course of action.    Thank you coming to see me for your colonoscopy. Please let me know if there is any further information that you need.    Dr. Abiel Beebe III, MD, FAAFP  Faculty Physician, [facility]    Department of Family Medicine and Community Health  [employer] [facility] [employer]    Office: [phone]

## 2023-02-02 NOTE — ANESTHESIA CARE TRANSFER NOTE
Patient: Nubia Lee    Procedure: Procedure(s):  COLONOSCOPY WITH BIOPSIES       Diagnosis: Positive colorectal cancer screening using Cologuard test [R19.5]  Diagnosis Additional Information: No value filed.    Anesthesia Type:   MAC     Note:    Oropharynx: oropharynx clear of all foreign objects and spontaneously breathing  Level of Consciousness: drowsy  Oxygen Supplementation: room air    Independent Airway: airway patency satisfactory and stable  Dentition: dentition unchanged  Vital Signs Stable: post-procedure vital signs reviewed and stable  Report to RN Given: handoff report given  Patient transferred to: Phase II    Handoff Report: Identifed the Patient, Identified the Reponsible Provider, Reviewed the pertinent medical history, Discussed the surgical course, Reviewed Intra-OP anesthesia mangement and issues during anesthesia, Set expectations for post-procedure period and Allowed opportunity for questions and acknowledgement of understanding      Vitals:  Vitals Value Taken Time   /57 02/02/23 1040   Temp 97.7  F (36.5  C) 02/02/23 1040   Pulse 81 02/02/23 1040   Resp 16 02/02/23 1040   SpO2 98 % 02/02/23 1040       Electronically Signed By: AREN Funk CRNA  February 2, 2023  10:41 AM

## 2023-02-02 NOTE — ANESTHESIA PREPROCEDURE EVALUATION
Anesthesia Pre-Procedure Evaluation    Patient: Nubia Lee   MRN: [id number] : 1950        Procedure : Procedure(s):  COLONOSCOPY          Past Medical History:   Diagnosis Date     Arthritis      Breast cancer (H)      Ectopic pregnancy      Motion sickness      Osteopenia      Palpitations      PONV (postoperative nausea and vomiting)       Past Surgical History:   Procedure Laterality Date     C/SECTION, CLASSICAL       ECTOPIC PREGNANCY SURGERY       LAPAROSCOPY       LUMPECTOMY BREAST Right      TONSILLECTOMY & ADENOIDECTOMY        Allergies   Allergen Reactions     Macrodantin [Nitrofurantoin] Rash      Social History     Tobacco Use     Smoking status: Former     Types: Cigarettes     Quit date:      Years since quittin.1     Smokeless tobacco: Never   Substance Use Topics     Alcohol use: Yes      Wt Readings from Last 1 Encounters:   23 53.1 kg (117 lb)        Anesthesia Evaluation   Pt has had prior anesthetic.     History of anesthetic complications  - PONV.      ROS/MED HX  ENT/Pulmonary:  - neg pulmonary ROS     Neurologic:  - neg neurologic ROS     Cardiovascular:  - neg cardiovascular ROS   (+) Dyslipidemia -----    METS/Exercise Tolerance:     Hematologic:  - neg hematologic  ROS     Musculoskeletal:  - neg musculoskeletal ROS (+) arthritis,     GI/Hepatic:  - neg GI/hepatic ROS     Renal/Genitourinary:  - neg Renal ROS     Endo:  - neg endo ROS     Psychiatric/Substance Use: Comment: adhd - neg psychiatric ROS     Infectious Disease:  - neg infectious disease ROS     Malignancy:  - neg malignancy ROS (+) Malignancy, History of Breast.Breast CA Remission status post.        Other:  - neg other ROS          Physical Exam    Airway        Mallampati: II   TM distance: > 3 FB   Neck ROM: full   Mouth opening: > 3 cm    Respiratory Devices and Support         Dental       (+) Multiple crowns, permanant bridges      Cardiovascular   cardiovascular exam normal           Pulmonary   pulmonary exam normal                OUTSIDE LABS:  CBC:   Lab Results   Component Value Date    WBC 7.5 09/09/2010    WBC 8.5 10/16/2008    HGB 13.6 09/09/2010    HGB 13.8 10/16/2008    HCT 39.8 09/09/2010    HCT 41.1 10/16/2008     09/09/2010     10/16/2008     BMP:   Lab Results   Component Value Date     10/28/2022     08/06/2021    POTASSIUM 4.2 10/28/2022    POTASSIUM 4.5 08/06/2021    CHLORIDE 109 10/28/2022    CHLORIDE 110 (H) 08/06/2021    CO2 27 10/28/2022    CO2 28 08/06/2021    BUN 25 10/28/2022    BUN 28 08/06/2021    CR 0.61 10/28/2022    CR 0.71 08/06/2021     (H) 10/28/2022     (H) 08/06/2021     COAGS: No results found for: PTT, INR, FIBR  POC: No results found for: BGM, HCG, HCGS  HEPATIC:   Lab Results   Component Value Date    ALBUMIN 3.6 10/28/2022    PROTTOTAL 7.2 10/28/2022    ALT 32 10/28/2022    AST 19 10/28/2022    ALKPHOS 69 10/28/2022    BILITOTAL 0.6 10/28/2022     OTHER:   Lab Results   Component Value Date    TAM 9.3 10/28/2022    PHOS 2.9 07/08/2020    TSH 0.92 10/28/2022       Anesthesia Plan    ASA Status:  2   NPO Status:  NPO Appropriate    Anesthesia Type: MAC.     - Reason for MAC: immobility needed, straight local not clinically adequate   Induction: Propofol.   Maintenance: TIVA.        Consents    Anesthesia Plan(s) and associated risks, benefits, and realistic alternatives discussed. Questions answered and patient/representative(s) expressed understanding.    - Discussed:     - Discussed with:  Patient         Postoperative Care    Pain management: Multi-modal analgesia.   PONV prophylaxis: Ondansetron (or other 5HT-3), Dexamethasone or Solumedrol     Comments:    Other Comments: propofol    Reviewed anesthetic options and risks. Patient agrees to proceed.             Steve Dennis MD

## 2023-02-02 NOTE — PROGRESS NOTES
Pt and family verbalize good understanding of discharge teach and follow up with MD.   VSS,Surgical incision CDI. D/C criteria met. Pt verbalizes readiness to go home.   Home with daughter.  Delay discharge due to no ride    @ME2@ 2/2/2023 12:01 PM

## 2023-02-09 NOTE — PROGRESS NOTES
23    RE: NUBIA MONTERO, : 1950    Lindsey Romero    I have performed the colonoscopy on Nubia Montero and found one small tubular adenoma. Based on the pathology, current guidelines recommend repeat exam in 7-10 years for surveillance colonoscopy (in the years 7955-6985).    Depending on the patient's health when their next exam would come due, it is possible that they would not have the life expectancy to benefit from repeat colon cancer screening. I recommend a discussion with their PCP at that time to determine the best course of action. We discussed this and I answered their questions.    Your patient has been informed of the results and recommended follow up plan.    Thank you for the referral. Please let me know if there is any further information that you need.    Very Respectfully,    Iker Beebe III, MD, FAAFP  Faculty Physician, [facility]    Department of Family Medicine and Community Health  [employer] [facility] [employer]    Office: [phone]

## 2023-02-10 PROBLEM — D36.9 TUBULAR ADENOMA: Status: ACTIVE | Noted: 2023-02-10

## 2023-03-04 RX ORDER — ROSUVASTATIN CALCIUM 5 MG/1
TABLET, COATED ORAL
Qty: 90 TABLET | Refills: 0 | OUTPATIENT
Start: 2023-03-04

## 2023-04-29 ENCOUNTER — OFFICE VISIT (OUTPATIENT)
Dept: URGENT CARE | Facility: URGENT CARE | Age: 73
End: 2023-04-29
Payer: COMMERCIAL

## 2023-04-29 VITALS
RESPIRATION RATE: 12 BRPM | SYSTOLIC BLOOD PRESSURE: 136 MMHG | WEIGHT: 119 LBS | HEART RATE: 100 BPM | DIASTOLIC BLOOD PRESSURE: 67 MMHG | OXYGEN SATURATION: 98 % | TEMPERATURE: 97.3 F | BODY MASS INDEX: 21.42 KG/M2

## 2023-04-29 DIAGNOSIS — R39.15 URGENCY OF URINATION: ICD-10-CM

## 2023-04-29 DIAGNOSIS — N30.00 ACUTE CYSTITIS WITHOUT HEMATURIA: Primary | ICD-10-CM

## 2023-04-29 LAB
ALBUMIN UR-MCNC: NEGATIVE MG/DL
APPEARANCE UR: ABNORMAL
BACTERIA #/AREA URNS HPF: ABNORMAL /HPF
BILIRUB UR QL STRIP: NEGATIVE
COLOR UR AUTO: YELLOW
GLUCOSE UR STRIP-MCNC: NEGATIVE MG/DL
HGB UR QL STRIP: ABNORMAL
KETONES UR STRIP-MCNC: NEGATIVE MG/DL
LEUKOCYTE ESTERASE UR QL STRIP: ABNORMAL
NITRATE UR QL: NEGATIVE
PH UR STRIP: 5.5 [PH] (ref 5–7)
RBC #/AREA URNS AUTO: ABNORMAL /HPF
SP GR UR STRIP: 1.01 (ref 1–1.03)
UROBILINOGEN UR STRIP-ACNC: 0.2 E.U./DL
WBC #/AREA URNS AUTO: ABNORMAL /HPF

## 2023-04-29 PROCEDURE — 81001 URINALYSIS AUTO W/SCOPE: CPT

## 2023-04-29 PROCEDURE — 99213 OFFICE O/P EST LOW 20 MIN: CPT

## 2023-04-29 PROCEDURE — 87086 URINE CULTURE/COLONY COUNT: CPT

## 2023-04-29 RX ORDER — METHYLPHENIDATE HYDROCHLORIDE EXTENDED RELEASE 10 MG/1
1 TABLET ORAL
COMMUNITY
Start: 2023-04-14 | End: 2024-03-11

## 2023-04-29 RX ORDER — METHYLPHENIDATE HYDROCHLORIDE 27 MG/1
1 TABLET, EXTENDED RELEASE ORAL DAILY
COMMUNITY
Start: 2023-03-15 | End: 2024-03-11

## 2023-04-29 RX ORDER — CEPHALEXIN 500 MG/1
500 CAPSULE ORAL 3 TIMES DAILY
Qty: 21 CAPSULE | Refills: 0 | Status: SHIPPED | OUTPATIENT
Start: 2023-04-29 | End: 2023-05-06

## 2023-04-29 NOTE — PATIENT INSTRUCTIONS
Urine test shows a urinary tract infection.  Take the antibiotic as prescribed and finish the full course even if symptoms improve.  Try yogurt with active cultures or probiotics such as Culturelle daily to help prevent diarrhea while using antibiotics.  You can also try over the counter Azo and/or cranberry supplements for your urinary symptoms.

## 2023-04-29 NOTE — PROGRESS NOTES
ASSESSMENT:   (N30.00) Acute cystitis without hematuria  (primary encounter diagnosis)  Plan: cephALEXin (KEFLEX) 500 MG capsule    (R39.15) Urgency of urination  Plan: UA Macroscopic with reflex to Microscopic and         Culture, Urine Microscopic Exam, Urine Culture    PLAN:  Informed the patient that the urine test shows urinary tract infection.  Urinary tract infections in women patient instructions discussed and provided.  We discussed the need to take the antibiotic as prescribed and finish the full course even if symptoms improve.  We also discussed trying yogurt with active cultures or probiotic such as Culturelle daily to help prevent diarrhea while taking the antibiotic.  Informed her she can try over-the-counter Azo and/or cranberry supplements for her urinary symptoms.  Discussed the need to return to clinic with any new or worsening symptoms.  Patient acknowledged her understanding of the above plan.    The use of Dragon/Zaarlyation services may have been used to construct the content in this note; any grammatical or spelling errors are non-intentional. Please contact the author of this note directly if you are in need of any clarification.      Inocente Verdugo, AREN CNP     SUBJECTIVE:   Nubia Lee is a 73 year old female who  presents today for a possible UTI. Symptoms of dysuria, urgency, frequency and burning have been going on for 1week.  Hematuria no.  gradual onset and mild to moderate.  There is no history of fever, chills, nausea or vomiting.  This patient does have a history of urinary tract infections. Patient denies vaginal symptoms or STD concerns.    ROS:   Negative except noted above.    OBJECTIVE:  /67   Pulse 100   Temp 97.3  F (36.3  C) (Tympanic)   Resp 12   Wt 54 kg (119 lb)   SpO2 98%   BMI 21.42 kg/m    GENERAL APPEARANCE: healthy, alert and no distress  RESP: lungs clear to auscultation - no rales, rhonchi or wheezes  CV: regular rates and rhythm,  normal S1 S2, no murmur noted  ABDOMEN:  soft, nontender, no HSM or masses and bowel sounds normal  BACK: No CVA tenderness  SKIN: no suspicious lesions or rashes    UA: positive for WBC's, positive for leukocytes and positive for bacturia

## 2023-05-02 LAB — BACTERIA UR CULT: NORMAL

## 2023-12-03 DIAGNOSIS — E78.01 FAMILIAL HYPERCHOLESTEROLEMIA: ICD-10-CM

## 2023-12-04 RX ORDER — ROSUVASTATIN CALCIUM 5 MG/1
5 TABLET, COATED ORAL DAILY
Qty: 90 TABLET | Refills: 0 | Status: SHIPPED | OUTPATIENT
Start: 2023-12-04 | End: 2024-02-28

## 2024-01-28 ENCOUNTER — HEALTH MAINTENANCE LETTER (OUTPATIENT)
Age: 74
End: 2024-01-28

## 2024-02-28 DIAGNOSIS — E78.01 FAMILIAL HYPERCHOLESTEROLEMIA: ICD-10-CM

## 2024-02-28 RX ORDER — ROSUVASTATIN CALCIUM 5 MG/1
5 TABLET, COATED ORAL DAILY
Qty: 30 TABLET | Refills: 0 | Status: SHIPPED | OUTPATIENT
Start: 2024-02-28 | End: 2024-03-25

## 2024-03-08 ENCOUNTER — NURSE TRIAGE (OUTPATIENT)
Dept: OBGYN | Facility: CLINIC | Age: 74
End: 2024-03-08
Payer: COMMERCIAL

## 2024-03-08 ENCOUNTER — MYC MEDICAL ADVICE (OUTPATIENT)
Dept: FAMILY MEDICINE | Facility: CLINIC | Age: 74
End: 2024-03-08
Payer: COMMERCIAL

## 2024-03-08 NOTE — TELEPHONE ENCOUNTER
RN attempting to call to triage [facility].    RN left message to return call to clinic [phone].  (RN did not leave specific details on voicemail for confidential reasons)    Ronda Martin RN on 3/8/2024 at 2:57 PM

## 2024-03-11 ENCOUNTER — OFFICE VISIT (OUTPATIENT)
Dept: FAMILY MEDICINE | Facility: CLINIC | Age: 74
End: 2024-03-11
Payer: COMMERCIAL

## 2024-03-11 VITALS
WEIGHT: 116 LBS | SYSTOLIC BLOOD PRESSURE: 124 MMHG | HEIGHT: 62 IN | BODY MASS INDEX: 21.35 KG/M2 | HEART RATE: 98 BPM | TEMPERATURE: 97.4 F | DIASTOLIC BLOOD PRESSURE: 62 MMHG | OXYGEN SATURATION: 99 % | RESPIRATION RATE: 14 BRPM

## 2024-03-11 DIAGNOSIS — R10.9 FLANK PAIN: Primary | ICD-10-CM

## 2024-03-11 DIAGNOSIS — Z51.81 ENCOUNTER FOR THERAPEUTIC DRUG LEVEL MONITORING: ICD-10-CM

## 2024-03-11 DIAGNOSIS — F90.0 ATTENTION DEFICIT HYPERACTIVITY DISORDER (ADHD), PREDOMINANTLY INATTENTIVE TYPE: ICD-10-CM

## 2024-03-11 LAB
ALBUMIN UR-MCNC: NEGATIVE MG/DL
AMPHETAMINES UR QL: NOT DETECTED
APPEARANCE UR: CLEAR
BARBITURATES UR QL SCN: NOT DETECTED
BENZODIAZ UR QL SCN: NOT DETECTED
BILIRUB UR QL STRIP: NEGATIVE
BUPRENORPHINE UR QL: NOT DETECTED
CANNABINOIDS UR QL: NOT DETECTED
COCAINE UR QL SCN: NOT DETECTED
COLOR UR AUTO: YELLOW
D-METHAMPHET UR QL: NOT DETECTED
GLUCOSE UR STRIP-MCNC: NEGATIVE MG/DL
HGB UR QL STRIP: NEGATIVE
KETONES UR STRIP-MCNC: NEGATIVE MG/DL
LEUKOCYTE ESTERASE UR QL STRIP: NEGATIVE
METHADONE UR QL SCN: NOT DETECTED
NITRATE UR QL: NEGATIVE
OPIATES UR QL SCN: NOT DETECTED
OXYCODONE UR QL SCN: NOT DETECTED
PCP UR QL SCN: NOT DETECTED
PH UR STRIP: 6 [PH] (ref 5–7)
SP GR UR STRIP: 1.02 (ref 1–1.03)
TRICYCLICS UR QL SCN: NOT DETECTED
UROBILINOGEN UR STRIP-ACNC: 0.2 E.U./DL

## 2024-03-11 PROCEDURE — 99213 OFFICE O/P EST LOW 20 MIN: CPT | Mod: 25 | Performed by: PHYSICIAN ASSISTANT

## 2024-03-11 PROCEDURE — 36415 COLL VENOUS BLD VENIPUNCTURE: CPT | Performed by: PHYSICIAN ASSISTANT

## 2024-03-11 PROCEDURE — 80048 BASIC METABOLIC PNL TOTAL CA: CPT | Performed by: PHYSICIAN ASSISTANT

## 2024-03-11 PROCEDURE — 93000 ELECTROCARDIOGRAM COMPLETE: CPT | Performed by: PHYSICIAN ASSISTANT

## 2024-03-11 PROCEDURE — 80306 DRUG TEST PRSMV INSTRMNT: CPT | Mod: 59 | Performed by: PHYSICIAN ASSISTANT

## 2024-03-11 PROCEDURE — 81003 URINALYSIS AUTO W/O SCOPE: CPT | Performed by: PHYSICIAN ASSISTANT

## 2024-03-11 RX ORDER — RESPIRATORY SYNCYTIAL VIRUS VACCINE 120MCG/0.5
0.5 KIT INTRAMUSCULAR ONCE
Qty: 1 EACH | Refills: 0 | Status: CANCELLED | OUTPATIENT
Start: 2024-03-11 | End: 2024-03-11

## 2024-03-11 ASSESSMENT — PATIENT HEALTH QUESTIONNAIRE - PHQ9
SUM OF ALL RESPONSES TO PHQ QUESTIONS 1-9: 1
SUM OF ALL RESPONSES TO PHQ QUESTIONS 1-9: 1

## 2024-03-11 ASSESSMENT — PAIN SCALES - GENERAL: PAINLEVEL: MODERATE PAIN (5)

## 2024-03-11 NOTE — PATIENT INSTRUCTIONS
Anthony Delacruz,    Thank you for allowing [facility] to manage your care.    I am unsure of the cause of your symptoms, but it is encouraging that you currently have no symptoms and we will see what our workup shows.     If you develop worsening/changing symptoms at any time, please be seen in clinic/urgent care or call 911/go to the emergency department for evaluation.    I ordered some lab work. Please go to the laboratory to get your studies.    Please allow 1-2 business days for our office to contact you in regards to your laboratory/radiological studies.  If not done so, I encourage you to login into [email or web address] to review your results as well.     Drink 8-10 glasses of fluid daily to stay well-hydrated.    For your pain, please use Tylenol 650mg every 6 hours as needed. Max acetaminophen (Tylenol) 3,000mg/24 hours    If you have any questions or concerns, please feel free to call us at [phone]    Sincerely,    Ok Berger PA-C    Did you know?      You can schedule a video visit for follow-up appointments as well as future appointments for certain conditions.  Please see the below link.     [email or web address]    If you have not already done so,  I encourage you to sign up for [email or web address] ([email or web address]).  This will allow you to review your results, securely communicate with a provider, and schedule virtual visits as well.

## 2024-03-11 NOTE — PROGRESS NOTES
Assessment & Plan   Problem List Items Addressed This Visit          Behavioral    Attention deficit hyperactivity disorder (ADHD), predominantly inattentive type    Relevant Orders    EKG 12-lead complete w/read - Clinics (Completed)    Urine Drug Screen Clinic (Completed)     Other Visit Diagnoses       Flank pain    -  Primary    Relevant Orders    Basic metabolic panel  (Ca, Cl, CO2, Creat, Gluc, K, Na, BUN)    UA Macroscopic with reflex to Microscopic and Culture - Lab Collect (Completed)    Encounter for therapeutic drug level monitoring        Relevant Orders    Urine Drug Screen Clinic (Completed)           Back pain of unknown etiology. She feels it is due to Sudafed use. No symptoms now. UA shows no hematuria to suggest urinary calculi or clear UTI. Recommended no more Sudafed and to monitor. Benign exm. No red flags of back pain, saddle anesthesia, weakness, incontinence, etc. EKG and Utox screen not concerning and she will pass this info on to her psychiatrist. Follow up with primary or myself prn.    Complete history and physical exam as below. Afebrile with normal vital signs.    DDx and Dx discussed with and explained to the pt to their satisfaction.  All questions were answered at this time. Pt expressed understanding of and agreement with this dx, tx, and plan. No further workup warranted and standard medication warnings given. I have given the patient a list of pertinent indications for re-evaluation. Will go to the Emergency Department if symptoms worsen or new concerning symptoms arise. Patient left in no apparent distress.     Ordering of each unique test  23 minutes spent by me on the date of the encounter doing chart review, history and exam, documentation and further activities per the note     See Patient Instructions      Jaja Delacruz is a 74 year old, presenting for the following health issues:  Pain        3/11/2024     2:13 PM   Additional Questions   Roomed by Amie Reza  WARREN   Accompanied by N/A         3/11/2024     2:13 PM   Patient Reported Additional Medications   Patient reports taking the following new medications No new medications     Via the Health Maintenance questionnaire, the patient has reported the following services have been completed -Mammogram, this information has been sent to the abstraction team.  History of Present Illness       Back Pain:  She presents for follow up of back pain. Patient's back pain is a new problem.    Original cause of back pain: not sure  First noticed back pain: 1-4 weeks ago  Patient feels back pain: dailyLocation of back pain:  Left middle of back  Description of back pain: dull ache  Back pain spreads: left buttocks    Since patient first noticed back pain, pain is: always present, but gets better and worse  Does back pain interfere with her job:  No  On a scale of 1-10 (10 being the worst), patient describes pain as:  6  What makes back pain worse: twisting and nothing   Acupuncture: not tried  Acetaminophen: helpful  Activity or exercise: not helpful  Chiropractor:  Helpful  Cold: not tried  Heat: not helpful  Massage: not tried  Muscle relaxants: not tried  NSAIDS: helpful  Opioids: not tried  Physical Therapy: not tried  Rest: helpful  Steroid Injection: not tried  Stretching: not helpful  Surgery: not tried  TENS unit: not tried  Topical pain relievers: not tried  Other healthcare providers patient is seeing for back pain: Chiropractor    She eats 2-3 servings of fruits and vegetables daily.She consumes 0 sweetened beverage(s) daily.She exercises with enough effort to increase her heart rate 30 to 60 minutes per day.  She exercises with enough effort to increase her heart rate 3 or less days per week.   She is taking medications regularly.     Patient describes pain as a dull ache on the left side of low back for the last month. Flank pain seems to increase when taking Sudafed for sinus symptoms and with movement. Radiates into the  "left buttock and there is associate muscle cramps. Back pain absent now, but was last present yesterday. Sometimes has dark urine. No fevers, chills, n/v, urinary/bowel/vaginal symptoms. Needs urine drug screen and EKG for her psychiatrist at [facility].    Review of Systems  Constitutional, HEENT, cardiovascular, pulmonary, gi and gu systems are negative, except as otherwise noted.      Objective    /62   Pulse 98   Temp 97.4  F (36.3  C) (Temporal)   Resp 14   Ht 1.575 m (5' 2.01\")   Wt 52.6 kg (116 lb)   SpO2 99%   BMI 21.21 kg/m    Body mass index is 21.21 kg/m .  Physical Exam  Vitals and nursing note reviewed.   Constitutional:       General: She is not in acute distress.     Appearance: She is not ill-appearing or diaphoretic.   HENT:      Head: Normocephalic and atraumatic.      Mouth/Throat:      Mouth: Mucous membranes are moist.   Eyes:      Conjunctiva/sclera: Conjunctivae normal.   Cardiovascular:      Rate and Rhythm: Normal rate and regular rhythm.      Heart sounds: Normal heart sounds. No murmur heard.     No friction rub. No gallop.   Pulmonary:      Effort: Pulmonary effort is normal. No respiratory distress.      Breath sounds: Normal breath sounds. No stridor. No wheezing, rhonchi or rales.   Abdominal:      General: Bowel sounds are normal. There is no distension.      Palpations: Abdomen is soft. There is no mass.      Tenderness: There is no abdominal tenderness. There is no right CVA tenderness, left CVA tenderness, guarding or rebound.      Hernia: No hernia is present.   Musculoskeletal:      Comments: No CVA or midline spinal tenderness. No overlying signs of trauma or infection.   Skin:     General: Skin is warm and dry.   Neurological:      General: No focal deficit present.      Mental Status: She is alert. Mental status is at baseline.      Comments: Able to toe lift, heel walk and knee bend.   Psychiatric:         Mood and Affect: Mood normal.         Behavior: Behavior " normal.        EKG nonischemic and without worrisome dysrhythmia, though she does have poor R wave progression similar to previous tracing in 2019. No other significant changes.  Results for orders placed or performed in visit on 03/11/24   UA Macroscopic with reflex to Microscopic and Culture - Lab Collect     Status: Normal    Specimen: Urine, Clean Catch   Result Value Ref Range    Color Urine Yellow Colorless, Straw, Light Yellow, Yellow    Appearance Urine Clear Clear    Glucose Urine Negative Negative mg/dL    Bilirubin Urine Negative Negative    Ketones Urine Negative Negative mg/dL    Specific Gravity Urine 1.025 1.003 - 1.035    Blood Urine Negative Negative    pH Urine 6.0 5.0 - 7.0    Protein Albumin Urine Negative Negative mg/dL    Urobilinogen Urine 0.2 0.2, 1.0 E.U./dL    Nitrite Urine Negative Negative    Leukocyte Esterase Urine Negative Negative    Narrative    Microscopic not indicated   Urine Drug Screen Clinic     Status: Normal   Result Value Ref Range    Cannabinoids (10-dda-1-carboxy-9-THC) Not Detected Not Detected, Indeterminate    Phencyclidine Not Detected Not Detected, Indeterminate    Cocaine (Benzoylecgonine) Not Detected Not Detected, Indeterminate    Methamphetamine (d-Methamphetamine) Not Detected Not Detected, Indeterminate    Opiates (Morphine) Not Detected Not Detected, Indeterminate    Amphetamine (d-Amphetamine) Not Detected Not Detected, Indeterminate    Benzodiazepines (Nordiazepam) Not Detected Not Detected, Indeterminate    Tricyclic Antidepressants (Desipramine) Not Detected Not Detected, Indeterminate    Methadone Not Detected Not Detected, Indeterminate    Barbiturates (Butalbital) Not Detected Not Detected, Indeterminate    Oxycodone Not Detected Not Detected, Indeterminate    Buprenorphine Not Detected Not Detected, Indeterminate           Signed Electronically by: JOSE Pena

## 2024-03-12 LAB
ANION GAP SERPL CALCULATED.3IONS-SCNC: 11 MMOL/L (ref 7–15)
BUN SERPL-MCNC: 34.1 MG/DL (ref 8–23)
CALCIUM SERPL-MCNC: 9.8 MG/DL (ref 8.8–10.2)
CHLORIDE SERPL-SCNC: 104 MMOL/L (ref 98–107)
CREAT SERPL-MCNC: 0.82 MG/DL (ref 0.51–0.95)
DEPRECATED HCO3 PLAS-SCNC: 27 MMOL/L (ref 22–29)
EGFRCR SERPLBLD CKD-EPI 2021: 75 ML/MIN/1.73M2
GLUCOSE SERPL-MCNC: 83 MG/DL (ref 70–99)
POTASSIUM SERPL-SCNC: 4.5 MMOL/L (ref 3.4–5.3)
SODIUM SERPL-SCNC: 142 MMOL/L (ref 135–145)

## 2024-03-24 SDOH — HEALTH STABILITY: PHYSICAL HEALTH: ON AVERAGE, HOW MANY MINUTES DO YOU ENGAGE IN EXERCISE AT THIS LEVEL?: 60 MIN

## 2024-03-24 SDOH — HEALTH STABILITY: PHYSICAL HEALTH: ON AVERAGE, HOW MANY DAYS PER WEEK DO YOU ENGAGE IN MODERATE TO STRENUOUS EXERCISE (LIKE A BRISK WALK)?: 2 DAYS

## 2024-03-24 ASSESSMENT — PATIENT HEALTH QUESTIONNAIRE - PHQ9
SUM OF ALL RESPONSES TO PHQ QUESTIONS 1-9: 0
SUM OF ALL RESPONSES TO PHQ QUESTIONS 1-9: 0

## 2024-03-24 ASSESSMENT — SOCIAL DETERMINANTS OF HEALTH (SDOH): HOW OFTEN DO YOU GET TOGETHER WITH FRIENDS OR RELATIVES?: ONCE A WEEK

## 2024-03-25 ENCOUNTER — OFFICE VISIT (OUTPATIENT)
Dept: FAMILY MEDICINE | Facility: CLINIC | Age: 74
End: 2024-03-25
Payer: COMMERCIAL

## 2024-03-25 VITALS
HEIGHT: 62 IN | TEMPERATURE: 97.1 F | HEART RATE: 94 BPM | SYSTOLIC BLOOD PRESSURE: 120 MMHG | DIASTOLIC BLOOD PRESSURE: 74 MMHG | WEIGHT: 116 LBS | OXYGEN SATURATION: 100 % | RESPIRATION RATE: 16 BRPM | BODY MASS INDEX: 21.35 KG/M2

## 2024-03-25 DIAGNOSIS — Z00.00 ENCOUNTER FOR MEDICARE ANNUAL WELLNESS EXAM: Primary | ICD-10-CM

## 2024-03-25 DIAGNOSIS — F90.0 ATTENTION DEFICIT HYPERACTIVITY DISORDER (ADHD), PREDOMINANTLY INATTENTIVE TYPE: ICD-10-CM

## 2024-03-25 DIAGNOSIS — Z51.81 ENCOUNTER FOR THERAPEUTIC DRUG MONITORING: ICD-10-CM

## 2024-03-25 DIAGNOSIS — E78.5 HYPERLIPIDEMIA, UNSPECIFIED HYPERLIPIDEMIA TYPE: ICD-10-CM

## 2024-03-25 DIAGNOSIS — Z29.11 NEED FOR VACCINATION AGAINST RESPIRATORY SYNCYTIAL VIRUS: ICD-10-CM

## 2024-03-25 DIAGNOSIS — E78.01 FAMILIAL HYPERCHOLESTEROLEMIA: ICD-10-CM

## 2024-03-25 DIAGNOSIS — Z78.0 POST-MENOPAUSAL: ICD-10-CM

## 2024-03-25 DIAGNOSIS — Z12.31 ENCOUNTER FOR SCREENING MAMMOGRAM FOR BREAST CANCER: ICD-10-CM

## 2024-03-25 DIAGNOSIS — M85.80 OSTEOPENIA, UNSPECIFIED LOCATION: ICD-10-CM

## 2024-03-25 PROCEDURE — G0439 PPPS, SUBSEQ VISIT: HCPCS | Performed by: NURSE PRACTITIONER

## 2024-03-25 PROCEDURE — G0481 DRUG TEST DEF 8-14 CLASSES: HCPCS | Mod: 59 | Performed by: NURSE PRACTITIONER

## 2024-03-25 PROCEDURE — 36415 COLL VENOUS BLD VENIPUNCTURE: CPT | Performed by: NURSE PRACTITIONER

## 2024-03-25 PROCEDURE — 99214 OFFICE O/P EST MOD 30 MIN: CPT | Mod: 25 | Performed by: NURSE PRACTITIONER

## 2024-03-25 PROCEDURE — 80307 DRUG TEST PRSMV CHEM ANLYZR: CPT | Performed by: NURSE PRACTITIONER

## 2024-03-25 PROCEDURE — 80061 LIPID PANEL: CPT | Performed by: NURSE PRACTITIONER

## 2024-03-25 RX ORDER — ROSUVASTATIN CALCIUM 5 MG/1
5 TABLET, COATED ORAL DAILY
Qty: 90 TABLET | Refills: 3 | Status: SHIPPED | OUTPATIENT
Start: 2024-03-25

## 2024-03-25 RX ORDER — RESPIRATORY SYNCYTIAL VIRUS VACCINE 120MCG/0.5
0.5 KIT INTRAMUSCULAR ONCE
Qty: 1 EACH | Refills: 0 | Status: CANCELLED | OUTPATIENT
Start: 2024-03-25 | End: 2024-03-25

## 2024-03-25 NOTE — PATIENT INSTRUCTIONS
[phone]-please call to schedule your mammogram    You can call imaging scheduling to set up appointment date, time, and location that works best for you to have bone density test [phone]  Please go to the pharmacy for your RSV (respiratory syncytial virus)vaccine.  You will need 1 dose of the RSV vaccine, 1 time. The RSV vaccine can prevent lower respiratory tract disease caused by respiratory syncytial virus (RSV). RSV is a common respiratory virus that usually causes mild, cold-like symptoms. RSV can cause illness in people of all ages but may be especially serious for infants and older adults. Adults at highest risk for severe RSV disease include older adults, adults with chronic medical conditions such as heart or lung disease, weakened immune systems, or certain other underlying medical conditions, or who live in nursing homes or long-term care facilities    Preventive Care Advice   This is general advice given by our system to help you stay healthy. However, your care team may have specific advice just for you. Please talk to your care team about your preventive care needs.  Nutrition  Eat 5 or more servings of fruits and vegetables each day.  Try wheat bread, brown rice and whole grain pasta (instead of white bread, rice, and pasta).  Get enough calcium and vitamin D. Check the label on foods and aim for 100% of the RDA (recommended daily allowance).  Lifestyle  Exercise at least 150 minutes each week   (30 minutes a day, 5 days a week).  Do muscle strengthening activities 2 days a week. These help control your weight and prevent disease.  No smoking.  Wear sunscreen to prevent skin cancer.  Have a dental exam and cleaning every 6 months.  Yearly exams  See your health care team every year to talk about:  Any changes in your health.  Any medicines your care team has prescribed.  Preventive care, family planning, and ways to prevent chronic diseases.  Shots (vaccines)   HPV shots (up to age 26), if  you've never had them before.  Hepatitis B shots (up to age 59), if you've never had them before.  COVID-19 shot: Get this shot when it's due.  Flu shot: Get a flu shot every year.  Tetanus shot: Get a tetanus shot every 10 years.  Pneumococcal, hepatitis A, and RSV shots: Ask your care team if you need these based on your risk.  Shingles shot (for age 50 and up).  General health tests  Diabetes screening:  Starting at age 35, Get screened for diabetes at least every 3 years.  If you are younger than age 35, ask your care team if you should be screened for diabetes.  Cholesterol test: At age 39, start having a cholesterol test every 5 years, or more often if advised.  Bone density scan (DEXA): At age 50, ask your care team if you should have this scan for osteoporosis (brittle bones).  Hepatitis C: Get tested at least once in your life.  STIs (sexually transmitted infections)  Before age 24: Ask your care team if you should be screened for STIs.  After age 24: Get screened for STIs if you're at risk. You are at risk for STIs (including HIV) if:  You are sexually active with more than one person.  You don't use condoms every time.  You or a partner was diagnosed with a sexually transmitted infection.  If you are at risk for HIV, ask about PrEP medicine to prevent HIV.  Get tested for HIV at least once in your life, whether you are at risk for HIV or not.  Cancer screening tests  Cervical cancer screening: If you have a cervix, begin getting regular cervical cancer screening tests at age 21. Most people who have regular screenings with normal results can stop after age 65. Talk about this with your provider.  Breast cancer scan (mammogram): If you've ever had breasts, begin having regular mammograms starting at age 40. This is a scan to check for breast cancer.  Colon cancer screening: It is important to start screening for colon cancer at age 45.  Have a colonoscopy test every 10 years (or more often if you're at  risk) Or, ask your provider about stool tests like a FIT test every year or Cologuard test every 3 years.  To learn more about your testing options, visit: https://www.[email or web address]/236853.pdf.  For help making a decision, visit: https://[email or web address]/hu77571.  Prostate cancer screening test: If you have a prostate and are age 55 to 69, ask your provider if you would benefit from a yearly prostate cancer screening test.  Lung cancer screening: If you are a current or former smoker age 50 to 80, ask your care team if ongoing lung cancer screenings are right for you.  For informational purposes only. Not to replace the advice of your health care provider. Copyright   2023 [facility]. All rights reserved. Clinically reviewed by the [facility] Transitions Program. [employer] 392131 - REV 01/24.

## 2024-03-25 NOTE — PROGRESS NOTES
Preventive Care Visit  [facility] AREN Loera CNP, Family Medicine  Mar 25, 2024      Assessment & Plan     Need for vaccination against respiratory syncytial virus  Encouraged to go to pharmacy for RSV vaccine    Hyperlipidemia, unspecified hyperlipidemia type  Tolerating Crestor/rosuvastatin well.  Will update lab here today.  Refill sent.  Follow-up in 1 year.  - Lipid panel reflex to direct LDL Non-fasting; Future  - Lipid panel reflex to direct LDL Fasting; Future  - Lipid panel reflex to direct LDL Fasting    Encounter for Medicare annual wellness exam  Screening guidelines reviewed.     Familial hypercholesterolemia  Tolerating Crestor/rosuvastatin well.  Will update lab here today.  Refill sent.  Follow-up in 1 year.  - rosuvastatin (CRESTOR) 5 MG tablet; Take 1 tablet (5 mg) by mouth daily    Encounter for screening mammogram for breast cancer  Order placed, plans to call per self and set up  - MA Screening Digital Bilateral; Future    Osteopenia, unspecified location  Order placed, plans to call per self and set up    Post-menopausal  Order placed, plans to call per self and set up  - DX Bone Density; Future    Attention deficit hyperactivity disorder (ADHD), predominantly inattentive type   reviewed.  Continue to follow with [facility] for medication management    Encounter for therapeutic drug monitoring  Will obtain urine drug screen per psychiatrist request.  - Drug Confirmation Panel Urine with Creat; Future  - Drug Confirmation Panel Urine with Creat      Counseling  Appropriate preventive services were discussed with this patient, including applicable screening as appropriate for fall prevention, nutrition, physical activity, Tobacco-use cessation, weight loss and cognition.  Checklist reviewing preventive services available has been given to the patient.  Reviewed patient's diet, addressing concerns and/or questions.   She is at risk for lack of  exercise and has been provided with information to increase physical activity for the benefit of her well-being.       Jaja Delacruz is a 74 year old, presenting for the following:  Physical        Via the Health Maintenance questionnaire, the patient has reported the following services have been completed -Mammogram, this information has been sent to the abstraction team.  Health Care Directive  Patient does not have a Health Care Directive or Living Will: Discussed advance care planning with patient; information given to patient to review.    HPI      Patient with history of ADHD and Hyperlipidemia arrived for Annual Medicare Physical. ADHD questionnaire given in room.     Last labs collected 03/11/24, results in chart.     Hyperlipidemia Follow-Up    Are you regularly taking any medication or supplement to lower your cholesterol?   Yes- Crestor  Are you having muscle aches or other side effects that you think could be caused by your cholesterol lowering medication?  No  Medication Followup of Ritalin  Taking Medication as prescribed: yes  Side Effects:  None  Medication Helping Symptoms:  yes    Medication Followup of Concerta   Taking Medication as prescribed: yes  Side Effects:  None  Medication Helping Symptoms:  yes    Please answer the questions below, rating yourself on each of the criteria shown using the scale on the right side of the page. As you answer each question, place an X in the box that best describes how you have felt and conducted yourself over the past 6 months.     Never Rarely Sometimes Often Very Often   1 How often do you have trouble wrapping up the final details of a project once the challenging parts have been done?   x     2 How often do you have difficulty getting things in order when you have to do a task that requires organization?    x    3 How often do you have problems remembering appointments or obligations?  x      4 When you have a task that requires a lot of thought,  how often do you avoid or delay getting started?     x   5 How often do you fidget or squirm with your hands or feet when you have to sit down for a long time?   x     6 How often do you feel overly active and compelled to do things, like you were driven by a motor?  x      7 How often do you make careless mistakes when you have to work on a boring or difficult project?   x     8 How often do you have difficulty keeping your attention when you are doing boring or repetitive work?    x    9 How often do you have difficulty concentrating on what people say to you, even when they are speaking to you directly?   x     10 How often do you misplace or have difficulty finding things at home or at work?  x      11 How often are you distracted by activity or noise around you?    x    12 How often do you leave your seat in meetings or other situations in which you are expected to remain seated? x       13 How often do you feel restless or fidgety?  x      14 How often do you have difficulty unwinding and relaxing when you have time to yourself?  x      15 How often do you find yourself talking too much when you are in social situations?  x      16 When you're in a conversation, how often do you find yourself finishing the sentences of the people you are talking to, before they can finish them themselves?  x      17 How often do you have difficulty waiting your turn in situations when turn-taking is required?  x      18 How often do you interrupt others when they are busy?   x              3/24/2024   General Health   How would you rate your overall physical health? Good   Feel stress (tense, anxious, or unable to sleep) Not at all         3/24/2024   Nutrition   Diet: Regular (no restrictions)         3/24/2024   Exercise   Days per week of moderate/strenous exercise 2 days   Average minutes spent exercising at this level 60 min   (!) EXERCISE CONCERN      3/24/2024   Social Factors   Frequency of gathering with friends or  relatives Once a week   Worry food won't last until get money to buy more No   Food not last or not have enough money for food? No   Do you have housing?  Yes   Are you worried about losing your housing? No   Lack of transportation? No   Unable to get utilities (heat,electricity)? No         3/24/2024   Fall Risk   Fallen 2 or more times in the past year? No   Trouble with walking or balance? No          3/24/2024   Activities of Daily Living- Home Safety   Needs help with the following daily activites None of the above   Safety concerns in the home None of the above         3/24/2024   Dental   Dentist two times every year? Yes         3/24/2024   Hearing Screening   Hearing concerns? None of the above         3/24/2024   Driving Risk Screening   Patient/family members have concerns about driving No         3/24/2024   General Alertness/Fatigue Screening   Have you been more tired than usual lately? No         3/24/2024   Urinary Incontinence Screening   Bothered by leaking urine in past 6 months No         3/24/2024   TB Screening   Were you born outside of the [place]? No       Today's PHQ-9 Score:       3/24/2024     4:56 PM   PHQ-9 SCORE   PHQ-9 Total Score MyChart 0   PHQ-9 Total Score 0         3/24/2024   Substance Use   Alcohol more than 3/day or more than 7/wk No   Do you have a current opioid prescription? No   How severe/bad is pain from 1 to 10? 0/10 (No Pain)   Do you use any other substances recreationally? No     Social History     Tobacco Use    Smoking status: Former     Types: Cigarettes     Quit date:      Years since quittin.2     Passive exposure: Past    Smokeless tobacco: Never   Vaping Use    Vaping Use: Never used   Substance Use Topics    Alcohol use: Yes    Drug use: Never            ASCVD Risk   The ASCVD Risk score (Kerri DK, et al., 2019) failed to calculate for the following reasons:    The valid HDL cholesterol range is 20 to 100 mg/dL            Reviewed and updated as  needed this visit by Provider                    Current providers sharing in care for this patient include:  Patient Care Team:  Lindsey Romero APRN CNP as PCP - General (Nurse Practitioner)  Cassandra Chambers MD as MD (Internal Medicine)  Coral Hughes APRN CNP as Assigned PCP    The following health maintenance items are reviewed in Epic and correct as of today:  Health Maintenance   Topic Date Due    DEPRESSION ACTION PLAN  Never done    RSV VACCINE (Pregnancy & 60+) (1 - 1-dose 60+ series) Never done    INFLUENZA VACCINE (1) 09/01/2023    COVID-19 Vaccine (5 - 2023-24 season) 09/01/2023    LIPID  10/28/2023    MAMMO SCREENING  11/04/2023    MEDICARE ANNUAL WELLNESS VISIT  12/07/2023    ANNUAL REVIEW OF HM ORDERS  12/07/2023    PHQ-9  09/25/2024    FALL RISK ASSESSMENT  03/25/2025    GLUCOSE  03/11/2027    ADVANCE CARE PLANNING  12/07/2027    DTAP/TDAP/TD IMMUNIZATION (2 - Td or Tdap) 10/11/2029    COLORECTAL CANCER SCREENING  02/02/2033    DEXA  12/13/2037    HEPATITIS C SCREENING  Completed    Pneumococcal Vaccine: 65+ Years  Completed    ZOSTER IMMUNIZATION  Completed    IPV IMMUNIZATION  Aged Out    HPV IMMUNIZATION  Aged Out    MENINGITIS IMMUNIZATION  Aged Out    RSV MONOCLONAL ANTIBODY  Aged Out     Here today for physical and medication check.  Is not fasting for labs.  Goes to [facility] every 3 months for ADHD.  Psychiatry has requested that has EKG and urine drug screen.   Last dose of stimulants was around 100 today.     Last bone density test December 2022.  Since then has been taking a bone supplements and would to see if there is improvement.     Has been going to ophthalmologist routinely for age related macular degeneration and is receiving injections.    Last Pap: February 2020-normal, negative HPV  Last mammogram: Nov 4th, 2022-[facility]. No other family memenbers with breast cancer   Last BMD: 12/22-osteopenia.   Last Colonoscopy: 2/23-2  "polyps. maternal great grand mother  Last eye exam: frequently   Last dental exam: every 6 mo  Last tetanus vaccine: 2019  Last influenza vaccine: declines   Last shingles vaccine: done   Last pneumonia vaccine: has had both doses   Last COVID vaccine: has had both doses  Last COVID booster: Plans to at pharmacy  Hep C screen: Done 10, 10/2019  HIV screen: Not applicable-low risk  AAA screen (age 65-78 with smoking hx): N/A  IVD (HTN, Hyperlipid, Smoking): Not applicable  Lung CA screening (50-77, 20 pk smoking hx) Quit within 15 years: Quit smoking in 1995         Objective    Exam  /74 (BP Location: Left arm, Patient Position: Chair, Cuff Size: Adult Regular)   Pulse 94   Temp 97.1  F (36.2  C) (Tympanic)   Resp 16   Ht 1.58 m (5' 2.21\")   Wt 52.6 kg (116 lb)   SpO2 100%   BMI 21.08 kg/m     Estimated body mass index is 21.08 kg/m  as calculated from the following:    Height as of this encounter: 1.58 m (5' 2.21\").    Weight as of this encounter: 52.6 kg (116 lb).    Physical Exam  Constitutional:       Appearance: Normal appearance. She is well-developed.   HENT:      Head: Normocephalic and atraumatic.      Right Ear: Tympanic membrane and external ear normal. No middle ear effusion.      Left Ear: Tympanic membrane and external ear normal.  No middle ear effusion.      Nose: No mucosal edema.   Neck:      Thyroid: No thyromegaly.      Vascular: No carotid bruit.   Cardiovascular:      Rate and Rhythm: Normal rate and regular rhythm.      Heart sounds: Normal heart sounds.   Pulmonary:      Effort: Pulmonary effort is normal.      Breath sounds: Normal breath sounds.   Abdominal:      General: Bowel sounds are normal.      Palpations: Abdomen is soft.   Skin:     General: Skin is warm and dry.   Neurological:      Mental Status: She is alert.   Psychiatric:         Behavior: Behavior normal.              3/25/2024   Mini Cog   Clock Draw Score 2 Normal   3 Item Recall 3 objects recalled   Mini Cog " Total Score 5              Signed Electronically by: AREN Robin CNP    Answers submitted by the patient for this visit:  Patient Health Questionnaire (Submitted on 3/24/2024)  PHQ9 TOTAL SCORE: 0

## 2024-03-26 LAB
CANNABINOIDS UR QL SCN: NORMAL
CHOLEST SERPL-MCNC: 187 MG/DL
CREAT UR-MCNC: 61 MG/DL
FASTING STATUS PATIENT QL REPORTED: NO
HDLC SERPL-MCNC: 106 MG/DL
LDLC SERPL CALC-MCNC: 66 MG/DL
NONHDLC SERPL-MCNC: 81 MG/DL
TRIGL SERPL-MCNC: 75 MG/DL

## 2024-03-27 LAB
ME-PHENIDATE UR CFM-MCNC: 286 NG/ML
ME-PHENIDATE UR CFM-MCNC: 9060 NG/ML
ME-PHENIDATE/CREAT UR: 469 NG/MG {CREAT}
ME-PHENIDATE/CREAT UR: ABNORMAL NG/MG {CREAT}

## 2024-04-07 ENCOUNTER — HEALTH MAINTENANCE LETTER (OUTPATIENT)
Age: 74
End: 2024-04-07

## 2024-11-14 NOTE — NURSING NOTE
Authorization received.    Notified [employer].   Chief Complaint   Patient presents with     Physical     Annual exam   Bria Bravo LPN

## 2025-03-16 DIAGNOSIS — E78.01 FAMILIAL HYPERCHOLESTEROLEMIA: ICD-10-CM

## 2025-03-16 RX ORDER — ROSUVASTATIN CALCIUM 5 MG/1
5 TABLET, COATED ORAL DAILY
Qty: 90 TABLET | Refills: 0 | Status: SHIPPED | OUTPATIENT
Start: 2025-03-16

## 2025-03-20 ENCOUNTER — PATIENT OUTREACH (OUTPATIENT)
Dept: CARE COORDINATION | Facility: CLINIC | Age: 75
End: 2025-03-20
Payer: COMMERCIAL

## 2025-04-03 ENCOUNTER — PATIENT OUTREACH (OUTPATIENT)
Dept: CARE COORDINATION | Facility: CLINIC | Age: 75
End: 2025-04-03
Payer: COMMERCIAL

## 2025-05-19 ENCOUNTER — PATIENT OUTREACH (OUTPATIENT)
Dept: CARE COORDINATION | Facility: CLINIC | Age: 75
End: 2025-05-19
Payer: COMMERCIAL

## 2025-05-22 PROBLEM — H35.30 MACULAR DEGENERATION (SENILE) OF RETINA: Status: ACTIVE | Noted: 2025-05-22

## 2025-06-17 DIAGNOSIS — E78.01 FAMILIAL HYPERCHOLESTEROLEMIA: ICD-10-CM

## 2025-06-17 RX ORDER — ROSUVASTATIN CALCIUM 5 MG/1
5 TABLET, COATED ORAL DAILY
Qty: 90 TABLET | Refills: 2 | Status: SHIPPED | OUTPATIENT
Start: 2025-06-17

## 2025-06-25 ENCOUNTER — ANCILLARY PROCEDURE (OUTPATIENT)
Dept: GENERAL RADIOLOGY | Facility: CLINIC | Age: 75
End: 2025-06-25
Attending: NURSE PRACTITIONER
Payer: COMMERCIAL

## 2025-06-25 ENCOUNTER — RESULTS FOLLOW-UP (OUTPATIENT)
Dept: FAMILY MEDICINE | Facility: CLINIC | Age: 75
End: 2025-06-25

## 2025-06-25 ENCOUNTER — OFFICE VISIT (OUTPATIENT)
Dept: FAMILY MEDICINE | Facility: CLINIC | Age: 75
End: 2025-06-25
Payer: COMMERCIAL

## 2025-06-25 VITALS
OXYGEN SATURATION: 95 % | HEIGHT: 61 IN | SYSTOLIC BLOOD PRESSURE: 122 MMHG | HEART RATE: 98 BPM | TEMPERATURE: 97.5 F | WEIGHT: 112.8 LBS | RESPIRATION RATE: 16 BRPM | BODY MASS INDEX: 21.3 KG/M2 | DIASTOLIC BLOOD PRESSURE: 66 MMHG

## 2025-06-25 DIAGNOSIS — E78.2 MIXED HYPERLIPIDEMIA: ICD-10-CM

## 2025-06-25 DIAGNOSIS — Z00.00 ENCOUNTER FOR MEDICARE ANNUAL WELLNESS EXAM: Primary | ICD-10-CM

## 2025-06-25 DIAGNOSIS — Z78.0 POST-MENOPAUSAL: ICD-10-CM

## 2025-06-25 DIAGNOSIS — R07.81 RIB PAIN ON LEFT SIDE: ICD-10-CM

## 2025-06-25 DIAGNOSIS — Z71.1 CONCERN ABOUT SKIN CANCER WITHOUT DIAGNOSIS: ICD-10-CM

## 2025-06-25 DIAGNOSIS — M54.6 ACUTE MIDLINE THORACIC BACK PAIN: ICD-10-CM

## 2025-06-25 DIAGNOSIS — M41.24 OTHER IDIOPATHIC SCOLIOSIS, THORACIC REGION: Primary | ICD-10-CM

## 2025-06-25 LAB
ALBUMIN UR-MCNC: NEGATIVE MG/DL
APPEARANCE UR: CLEAR
BILIRUB UR QL STRIP: NEGATIVE
COLOR UR AUTO: YELLOW
GLUCOSE UR STRIP-MCNC: NEGATIVE MG/DL
HGB UR QL STRIP: NEGATIVE
KETONES UR STRIP-MCNC: NEGATIVE MG/DL
LEUKOCYTE ESTERASE UR QL STRIP: NEGATIVE
NITRATE UR QL: NEGATIVE
PH UR STRIP: 6.5 [PH] (ref 5–7)
SP GR UR STRIP: 1.01 (ref 1–1.03)
UROBILINOGEN UR STRIP-ACNC: 0.2 E.U./DL

## 2025-06-25 PROCEDURE — G2211 COMPLEX E/M VISIT ADD ON: HCPCS | Performed by: NURSE PRACTITIONER

## 2025-06-25 PROCEDURE — 3078F DIAST BP <80 MM HG: CPT | Performed by: NURSE PRACTITIONER

## 2025-06-25 PROCEDURE — 81003 URINALYSIS AUTO W/O SCOPE: CPT | Performed by: NURSE PRACTITIONER

## 2025-06-25 PROCEDURE — 72072 X-RAY EXAM THORAC SPINE 3VWS: CPT | Mod: TC | Performed by: RADIOLOGY

## 2025-06-25 PROCEDURE — G0439 PPPS, SUBSEQ VISIT: HCPCS | Performed by: NURSE PRACTITIONER

## 2025-06-25 PROCEDURE — 3074F SYST BP LT 130 MM HG: CPT | Performed by: NURSE PRACTITIONER

## 2025-06-25 PROCEDURE — 99214 OFFICE O/P EST MOD 30 MIN: CPT | Mod: 25 | Performed by: NURSE PRACTITIONER

## 2025-06-25 SDOH — HEALTH STABILITY: PHYSICAL HEALTH: ON AVERAGE, HOW MANY DAYS PER WEEK DO YOU ENGAGE IN MODERATE TO STRENUOUS EXERCISE (LIKE A BRISK WALK)?: 3 DAYS

## 2025-06-25 SDOH — HEALTH STABILITY: PHYSICAL HEALTH: ON AVERAGE, HOW MANY MINUTES DO YOU ENGAGE IN EXERCISE AT THIS LEVEL?: 30 MIN

## 2025-06-25 ASSESSMENT — PATIENT HEALTH QUESTIONNAIRE - PHQ9
10. IF YOU CHECKED OFF ANY PROBLEMS, HOW DIFFICULT HAVE THESE PROBLEMS MADE IT FOR YOU TO DO YOUR WORK, TAKE CARE OF THINGS AT HOME, OR GET ALONG WITH OTHER PEOPLE: NOT DIFFICULT AT ALL
SUM OF ALL RESPONSES TO PHQ QUESTIONS 1-9: 1
SUM OF ALL RESPONSES TO PHQ QUESTIONS 1-9: 1

## 2025-06-25 ASSESSMENT — SOCIAL DETERMINANTS OF HEALTH (SDOH): HOW OFTEN DO YOU GET TOGETHER WITH FRIENDS OR RELATIVES?: ONCE A WEEK

## 2025-06-25 NOTE — PROGRESS NOTES
Preventive Care Visit  [facility] AREN Loera CNP, Family Medicine  Jun 25, 2025      Assessment & Plan     Encounter for Medicare annual wellness exam  Follow up in 1 year    Rib pain on left side  Acute midline thoracic back pain  Will obtain urine and imaging here today.  Full plan will depend on outcome  - UA Macroscopic with reflex to Microscopic and Culture; Future  - DX Bone Density; Future  - XR Thoracic Spine 3 Views; Future    Concern about skin cancer without diagnosis  - Adult Dermatology  Referral; Future    Post-menopausal  Order placed, plans to set up per self  - DX Bone Density; Future    Mixed hyperlipidemia  Previous lipids reviewed.  Tolerating rosuvastatin well.  Has refills available.  Follow-up in 1 year    The longitudinal plan of care for the diagnosis(es)/condition(s) as documented were addressed during this visit. Due to the added complexity in care, I will continue to support Nubia in the subsequent management and with ongoing continuity of care.       Counseling  Appropriate preventive services were addressed with this patient via screening, questionnaire, or discussion as appropriate for fall prevention, nutrition, physical activity, Tobacco-use cessation, social engagement, weight loss and cognition.  Checklist reviewing preventive services available has been given to the patient.  Reviewed patient's diet, addressing concerns and/or questions.   She is at risk for lack of exercise and has been provided with information to increase physical activity for the benefit of her well-being.   Discussed possible causes of fatigue.         Subjective   Nubia is a 75 year old, presenting for the following:  Medicare Visit        6/25/2025     2:30 PM   Additional Questions   Roomed by Mary   Accompanied by self          HPI  Left flank pains      Advance Care Planning    Discussed advance care planning with patient; informed AVS has link to  [employer].        6/25/2025   General Health   How would you rate your overall physical health? Good   Feel stress (tense, anxious, or unable to sleep) Not at all         6/25/2025   Nutrition   Diet: Regular (no restrictions)         6/25/2025   Exercise   Days per week of moderate/strenous exercise 3 days   Average minutes spent exercising at this level 30 min         6/25/2025   Social Factors   Frequency of gathering with friends or relatives Once a week   Worry food won't last until get money to buy more No   Food not last or not have enough money for food? No   Do you have housing? (Housing is defined as stable permanent housing and does not include staying outside in a car, in a tent, in an abandoned building, in an overnight shelter, or couch-surfing.) Yes   Are you worried about losing your housing? No   Lack of transportation? No   Unable to get utilities (heat,electricity)? No         6/25/2025   Fall Risk   Fallen 2 or more times in the past year? No   Trouble with walking or balance? No          6/25/2025   Activities of Daily Living- Home Safety   Needs help with the following daily activites None of the above   Safety concerns in the home None of the above         6/25/2025   Dental   Dentist two times every year? Yes         6/25/2025   Hearing Screening   Hearing concerns? None of the above         6/25/2025   Driving Risk Screening   Patient/family members have concerns about driving No         6/25/2025   General Alertness/Fatigue Screening   Have you been more tired than usual lately? (!) YES         6/25/2025   Urinary Incontinence Screening   Bothered by leaking urine in past 6 months No       Today's PHQ-9 Score:       6/25/2025    10:17 AM   PHQ-9 SCORE   PHQ-9 Total Score MyChart 1 (Minimal depression)   PHQ-9 Total Score 1        Patient-reported         6/25/2025   Substance Use   Alcohol more than 3/day or more than 7/wk No   Do you have a current opioid prescription? No   How  severe/bad is pain from 1 to 10? 4/10   Do you use any other substances recreationally? No     Social History     Tobacco Use    Smoking status: Former     Current packs/day: 0.00     Types: Cigarettes     Quit date:      Years since quittin.5     Passive exposure: Past    Smokeless tobacco: Never   Vaping Use    Vaping status: Never Used   Substance Use Topics    Alcohol use: Yes     Comment: rarely    Drug use: Never            ASCVD Risk   The ASCVD Risk score (Kerri DK, et al., 2019) failed to calculate for the following reasons:    The valid HDL cholesterol range is 20 to 100 mg/dL            Reviewed and updated as needed this visit by Provider                  Current providers sharing in care for this patient include:  Patient Care Team:  Coral Hughes APRN CNP as PCP - General (Family Medicine)  Cassandra Chambers MD as MD (Internal Medicine)  Coral Hughes APRN CNP as Assigned PCP    The following health maintenance items are reviewed in Epic and correct as of today:  Health Maintenance   Topic Date Due    DEPRESSION ACTION PLAN  Never done    COVID-19 VACCINE ( season) 2024    RSV VACCINE (1 - 1-dose 75+ series) Never done    MEDICARE ANNUAL WELLNESS VISIT  2025    ANNUAL REVIEW OF HM ORDERS  2025    INFLUENZA VACCINE (Season Ended) 2025    PHQ-9  2025    MAMMO SCREENING  2026    LIPID  2026    FALL RISK ASSESSMENT  2026    DIABETES SCREENING  2028    ADVANCE CARE PLANNING  2029    DTAP/TDAP/TD VACCINE (2 - Td or Tdap) 10/11/2029    COLORECTAL CANCER SCREENING  2033    DEXA  2037    HEPATITIS C SCREENING  Completed    PNEUMOCOCCAL VACCINE 50+ YEARS  Completed    ZOSTER VACCINE  Completed    HPV VACCINE  Aged Out    MENINGITIS VACCINE  Aged Out         Pain on going to left back for the last year.   Comes and goes  History of scholosisi  No increased pain with deep breath  Feels  "swollen. Bowels moving well.   Urinary urgency after surgery which has improved   Movement brings on pain  Walking long distance will bring it on.   No numbness or tinginlgin.     Last Pap: February 2020-normal, negative HPV  Last mammogram: Nov 4th, 2022-[facility]. No other family memenbers with breast cancer   Last BMD: 12/22-osteopenia.   Last Colonoscopy: 2/23-2 polyps. maternal great grand mother  Last eye exam: eery 3 mo  Last dental exam: every 6 mo  Last tetanus vaccine: 2019  Last influenza vaccine: declines   Last shingles vaccine: done   Last pneumonia vaccine: has had both doses   Last COVID vaccine: has had both doses  Last COVID booster: Plans to at pharmacy  Hep C screen: Done 10, 10/2019  HIV screen: Not applicable-low risk  AAA screen (age 65-78 with smoking hx): N/A  IVD (HTN, Hyperlipid, Smoking): Not applicable  Lung CA screening (50-77, 20 pk smoking hx) Quit within 15 years: Quit smoking in 1995       Objective    Exam  /66   Pulse 98   Temp 97.5  F (36.4  C) (Temporal)   Resp 16   Ht 1.552 m (5' 1.1\")   Wt 51.2 kg (112 lb 12.8 oz)   LMP  (LMP Unknown)   SpO2 95%   BMI 21.24 kg/m     Estimated body mass index is 21.24 kg/m  as calculated from the following:    Height as of this encounter: 1.552 m (5' 1.1\").    Weight as of this encounter: 51.2 kg (112 lb 12.8 oz).    Physical Exam  Constitutional:       Appearance: Normal appearance. She is well-developed.   HENT:      Head: Normocephalic and atraumatic.      Right Ear: Tympanic membrane and external ear normal. No middle ear effusion.      Left Ear: Tympanic membrane and external ear normal.  No middle ear effusion.      Nose: No mucosal edema.   Neck:      Thyroid: No thyromegaly.      Vascular: No carotid bruit.   Cardiovascular:      Rate and Rhythm: Normal rate and regular rhythm.      Heart sounds: Normal heart sounds.   Pulmonary:      Effort: Pulmonary effort is normal.      Breath sounds: Normal breath " sounds.   Abdominal:      General: Bowel sounds are normal.      Palpations: Abdomen is soft.   Musculoskeletal:      Thoracic back: Tenderness present. No signs of trauma, spasms or bony tenderness. Decreased range of motion. Scoliosis present.        Back:    Skin:     General: Skin is warm and dry.   Neurological:      Mental Status: She is alert.   Psychiatric:         Behavior: Behavior normal.             6/25/2025   Mini Cog   Clock Draw Score 2 Normal   3 Item Recall 3 objects recalled   Mini Cog Total Score 5              Signed Electronically by: AREN Robin CNP    Answers submitted by the patient for this visit:  Patient Health Questionnaire (Submitted on 6/25/2025)  If you checked off any problems, how difficult have these problems made it for you to do your work, take care of things at home, or get along with other people?: Not difficult at all  PHQ9 TOTAL SCORE: 1

## 2025-06-25 NOTE — PATIENT INSTRUCTIONS
Please go to the pharmacy for your RSV (respiratory syncytial virus)vaccine.  You will need 1 dose of the RSV vaccine, 1 time. The RSV vaccine can prevent lower respiratory tract disease caused by respiratory syncytial virus (RSV). RSV is a common respiratory virus that usually causes mild, cold-like symptoms. RSV can cause illness in people of all ages but may be especially serious for infants and older adults. Adults at highest risk for severe RSV disease include older adults, adults with chronic medical conditions such as heart or lung disease, weakened immune systems, or certain other underlying medical conditions, or who live in nursing homes or long-term care facilities   Patient Education   Preventive Care Advice   This is general advice given by our system to help you stay healthy. However, your care team may have specific advice just for you. Please talk to your care team about your preventive care needs.  Nutrition  Eat 5 or more servings of fruits and vegetables each day.  Try wheat bread, brown rice and whole grain pasta (instead of white bread, rice, and pasta).  Get enough calcium and vitamin D. Check the label on foods and aim for 100% of the RDA (recommended daily allowance).  Lifestyle  Exercise at least 150 minutes each week  (30 minutes a day, 5 days a week).  Do muscle strengthening activities 2 days a week. These help control your weight and prevent disease.  No smoking.  Wear sunscreen to prevent skin cancer.  Have a dental exam and cleaning every 6 months.  Yearly exams  See your health care team every year to talk about:  Any changes in your health.  Any medicines your care team has prescribed.  Preventive care, family planning, and ways to prevent chronic diseases.  Shots (vaccines)   HPV shots (up to age 26), if you've never had them before.  Hepatitis B shots (up to age 59), if you've never had them before.  COVID-19 shot: Get this shot when it's due.  Flu shot: Get a flu shot every  year.  Tetanus shot: Get a tetanus shot every 10 years.  Pneumococcal, hepatitis A, and RSV shots: Ask your care team if you need these based on your risk.  Shingles shot (for age 50 and up)  General health tests  Diabetes screening:  Starting at age 35, Get screened for diabetes at least every 3 years.  If you are younger than age 35, ask your care team if you should be screened for diabetes.  Cholesterol test: At age 39, start having a cholesterol test every 5 years, or more often if advised.  Bone density scan (DEXA): At age 50, ask your care team if you should have this scan for osteoporosis (brittle bones).  Hepatitis C: Get tested at least once in your life.  STIs (sexually transmitted infections)  Before age 24: Ask your care team if you should be screened for STIs.  After age 24: Get screened for STIs if you're at risk. You are at risk for STIs (including HIV) if:  You are sexually active with more than one person.  You don't use condoms every time.  You or a partner was diagnosed with a sexually transmitted infection.  If you are at risk for HIV, ask about PrEP medicine to prevent HIV.  Get tested for HIV at least once in your life, whether you are at risk for HIV or not.  Cancer screening tests  Cervical cancer screening: If you have a cervix, begin getting regular cervical cancer screening tests starting at age 21.  Breast cancer scan (mammogram): If you've ever had breasts, begin having regular mammograms starting at age 40. This is a scan to check for breast cancer.  Colon cancer screening: It is important to start screening for colon cancer at age 45.  Have a colonoscopy test every 10 years (or more often if you're at risk) Or, ask your provider about stool tests like a FIT test every year or Cologuard test every 3 years.  To learn more about your testing options, visit:   .  For help making a decision, visit:   https://bit.ly/yy60384.  Prostate cancer screening test: If you have a prostate, ask your  care team if a prostate cancer screening test (PSA) at age 55 is right for you.  Lung cancer screening: If you are a current or former smoker ages 50 to 80, ask your care team if ongoing lung cancer screenings are right for you.  For informational purposes only. Not to replace the advice of your health care provider. Copyright   2023 [employer]. All rights reserved. Clinically reviewed by the [facility]. [employer] [id number] - REV 01/24.  Learning About Sleeping Well  What does sleeping well mean?     Sleeping well means getting enough sleep to feel good and stay healthy. How much sleep is enough varies among people.  The number of hours you sleep and how you feel when you wake up are both important. If you do not feel refreshed, you probably need more sleep. Another sign of not getting enough sleep is feeling tired during the day.  Experts recommend that adults get at least 7 or more hours of sleep per day. Children and older adults need more sleep.  Why is getting enough sleep important?  Getting enough quality sleep is a basic part of good health. When your sleep suffers, your physical health, mood, and your thoughts can suffer too. You may find yourself feeling more grumpy or stressed. Not getting enough sleep also can lead to serious problems, including injury, accidents, anxiety, and depression.  What might cause poor sleeping?  Many things can cause sleep problems, including:  Changes to your sleep schedule.  Stress. Stress can be caused by fear about a single event, such as giving a speech. Or you may have ongoing stress, such as worry about work or school.  Depression, anxiety, and other mental or emotional conditions.  Changes in your sleep habits or surroundings. This includes changes that happen where you sleep, such as noise, light, or sleeping in a different bed. It also includes changes in your sleep pattern, such as having jet lag or working a late shift.  Health  "problems, such as pain, breathing problems, and restless legs syndrome.  Lack of regular exercise.  Using alcohol, nicotine, or caffeine before bed.  How can you help yourself?  Here are some tips that may help you sleep more soundly and wake up feeling more refreshed.  Your sleeping area   Use your bedroom only for sleeping and sex. A bit of light reading may help you fall asleep. But if it doesn't, do your reading elsewhere in the house. Try not to use your TV, computer, smartphone, or tablet while you are in bed.  Be sure your bed is big enough to stretch out comfortably, especially if you have a sleep partner.  Keep your bedroom quiet, dark, and cool. Use curtains, blinds, or a sleep mask to block out light. To block out noise, use earplugs, soothing music, or a \"white noise\" machine.  Your evening and bedtime routine   Create a relaxing bedtime routine. You might want to take a warm shower or bath, or listen to soothing music.  Go to bed at the same time every night. And get up at the same time every morning, even if you feel tired.  What to avoid   Limit caffeine (coffee, tea, caffeinated sodas) during the day, and don't have any for at least 6 hours before bedtime.  Avoid drinking alcohol before bedtime. Alcohol can cause you to wake up more often during the night.  Try not to smoke or use tobacco, especially in the evening. Nicotine can keep you awake.  Limit naps during the day, especially close to bedtime.  Avoid lying in bed awake for too long. If you can't fall asleep or if you wake up in the middle of the night and can't get back to sleep within about 20 minutes, get out of bed and go to another room until you feel sleepy.  Avoid taking medicine right before bed that may keep you awake or make you feel hyper or energized. Your doctor can tell you if your medicine may do this and if you can take it earlier in the day.  If you can't sleep   Imagine yourself in a peaceful, pleasant scene. Focus on the " "details and feelings of being in a place that is relaxing.  Get up and do a quiet or boring activity until you feel sleepy.  Avoid drinking any liquids before going to bed to help prevent waking up often to use the bathroom.  Where can you learn more?  Go to https://www.[employer].net/patiented  Enter J942 in the search box to learn more about \"Learning About Sleeping Well.\"  Current as of: July 31, 2024  Content Version: 14.5 2024-2025 [employer].   Care instructions adapted under license by your healthcare professional. If you have questions about a medical condition or this instruction, always ask your healthcare professional. [employer] disclaims any warranty or liability for your use of this information.       "

## 2025-06-26 ENCOUNTER — PATIENT OUTREACH (OUTPATIENT)
Dept: CARE COORDINATION | Facility: CLINIC | Age: 75
End: 2025-06-26
Payer: COMMERCIAL

## 2025-06-30 ENCOUNTER — PATIENT OUTREACH (OUTPATIENT)
Dept: CARE COORDINATION | Facility: CLINIC | Age: 75
End: 2025-06-30
Payer: COMMERCIAL

## 2025-07-01 ENCOUNTER — TELEPHONE (OUTPATIENT)
Dept: NEUROSURGERY | Facility: CLINIC | Age: 75
End: 2025-07-01
Payer: COMMERCIAL

## 2025-07-01 NOTE — TELEPHONE ENCOUNTER
SPINE PATIENTS - NEW PROTOCOL PREVISIT     RECORDS RECEVEIVED FROM: referred by Coral Hughes APRN CNP     REASON FOR VISIT: Other idiopathic scoliosis, thoracic region    PROVIDER: Carlos   DATE OF APPT: 07/10/2025     NOTES (FOR ALL VISITS) STATUS DETAILS   OFFICE NOTE from referring provider  Referral and notes in chart   OFFICE NOTE from other specialist     DISCHARGE SUMMARY from hospital     DISCHARGE REPORT from ER     OPERATIVE REPORT     EMG REPORT     Injection     Physical therapy       IMAGING  (FOR ALL VISITS) STATUS DETAILS   MRI (HEAD, NECK, SPINE)     XRAY (SPINE) *NEUROSURGERY*  XR thoracic 06/25/2025    CT (HEAD, NECK, SPINE)          Does patient have C2C?  Year last updated Action     YES   []      Please update at appointment if outdated more than 5 years       NO     [x]   N/A   Please complete C2C at appointment

## 2025-07-01 NOTE — TELEPHONE ENCOUNTER
Left Voicemail (1st Attempt) for patient to call [phone].    Please reschedule appointment on 7-10-25 with Dr. Gonzalez. Per clinical review, we found that it would be more appropriate for the patient to see Josue Worley PA-C. Please schedule using the below information:    Location:  Neurosurgery  Provider: Josue Worley PA-C   Appointment type: New Cervical Spine  Appointment mode: In Clinic  Return date: First available     Specialty phone number: [phone]    Is Imaging Needed: No  Imaging Phone Number to provide to patient: n/a    Additional Notes: Need to reschedule from Dr. Gonzalez to an ANDREW for new patient work up.

## 2025-07-10 ENCOUNTER — PRE VISIT (OUTPATIENT)
Dept: NEUROSURGERY | Facility: CLINIC | Age: 75
End: 2025-07-10

## 2025-07-22 ENCOUNTER — ANCILLARY PROCEDURE (OUTPATIENT)
Dept: BONE DENSITY | Facility: CLINIC | Age: 75
End: 2025-07-22
Attending: NURSE PRACTITIONER
Payer: COMMERCIAL

## 2025-07-22 DIAGNOSIS — M54.6 ACUTE MIDLINE THORACIC BACK PAIN: ICD-10-CM

## 2025-07-22 DIAGNOSIS — Z78.0 POST-MENOPAUSAL: ICD-10-CM

## 2025-07-22 PROCEDURE — 77080 DXA BONE DENSITY AXIAL: CPT | Mod: TC | Performed by: PHYSICIAN ASSISTANT

## 2025-08-22 PROBLEM — G89.29 CHRONIC LEFT-SIDED THORACIC BACK PAIN: Status: ACTIVE | Noted: 2025-08-22

## 2025-08-22 PROBLEM — M54.6 CHRONIC LEFT-SIDED THORACIC BACK PAIN: Status: ACTIVE | Noted: 2025-08-22

## 2025-08-25 ENCOUNTER — TELEPHONE (OUTPATIENT)
Dept: FAMILY MEDICINE | Facility: CLINIC | Age: 75
End: 2025-08-25
Payer: COMMERCIAL

## 2025-08-27 ENCOUNTER — PATIENT OUTREACH (OUTPATIENT)
Dept: ONCOLOGY | Facility: CLINIC | Age: 75
End: 2025-08-27

## 2025-08-27 ENCOUNTER — OFFICE VISIT (OUTPATIENT)
Dept: FAMILY MEDICINE | Facility: CLINIC | Age: 75
End: 2025-08-27
Payer: COMMERCIAL

## 2025-08-27 VITALS
SYSTOLIC BLOOD PRESSURE: 128 MMHG | RESPIRATION RATE: 18 BRPM | TEMPERATURE: 98.2 F | HEIGHT: 62 IN | WEIGHT: 110 LBS | DIASTOLIC BLOOD PRESSURE: 78 MMHG | OXYGEN SATURATION: 97 % | HEART RATE: 97 BPM | BODY MASS INDEX: 20.24 KG/M2

## 2025-08-27 DIAGNOSIS — R91.8 MULTIPLE LUNG NODULES ON CT: Primary | ICD-10-CM

## 2025-08-27 DIAGNOSIS — Z23 NEED FOR VACCINATION: ICD-10-CM

## 2025-08-27 DIAGNOSIS — Z85.3 PERSONAL HISTORY OF MALIGNANT NEOPLASM OF BREAST: ICD-10-CM

## 2025-08-27 PROCEDURE — 3078F DIAST BP <80 MM HG: CPT | Performed by: NURSE PRACTITIONER

## 2025-08-27 PROCEDURE — 3074F SYST BP LT 130 MM HG: CPT | Performed by: NURSE PRACTITIONER

## 2025-08-27 PROCEDURE — 99213 OFFICE O/P EST LOW 20 MIN: CPT | Performed by: NURSE PRACTITIONER

## 2025-08-27 PROCEDURE — G2211 COMPLEX E/M VISIT ADD ON: HCPCS | Performed by: NURSE PRACTITIONER

## 2025-08-27 PROCEDURE — 1126F AMNT PAIN NOTED NONE PRSNT: CPT | Performed by: NURSE PRACTITIONER

## 2025-08-27 ASSESSMENT — PAIN SCALES - GENERAL: PAINLEVEL_OUTOF10: NO PAIN (0)

## 2025-08-28 DIAGNOSIS — R91.8 PULMONARY NODULES: Primary | ICD-10-CM

## 2025-09-03 DIAGNOSIS — R91.8 PULMONARY NODULES: ICD-10-CM

## 2025-09-03 LAB
DLCOCOR-%PRED-PRE: 77 %
DLCOCOR-PRE: 13.54 ML/MIN/MMHG
DLCOUNC-%PRED-PRE: 79 %
DLCOUNC-PRE: 13.77 ML/MIN/MMHG
DLCOUNC-PRED: 17.43 ML/MIN/MMHG
ERV-%PRED-PRE: 45 %
ERV-PRE: 0.28 L
ERV-PRED: 0.6 L
EXPTIME-PRE: 10.16 SEC
FEF2575-%PRED-PRE: 51 %
FEF2575-PRE: 0.77 L/SEC
FEF2575-PRED: 1.51 L/SEC
FEFMAX-%PRED-PRE: 110 %
FEFMAX-PRE: 5.31 L/SEC
FEFMAX-PRED: 4.81 L/SEC
FEV1-%PRED-PRE: 87 %
FEV1-PRE: 1.61 L
FEV1FEV6-PRE: 75 %
FEV1FEV6-PRED: 78 %
FEV1FVC-PRE: 68 %
FEV1FVC-PRED: 79 %
FEV1SVC-PRE: 70 L
FEV1SVC-PRED: 73 L
FIFMAX-PRE: 3.59 L/SEC
FRCPLETH-%PRED-PRE: 109 %
FRCPLETH-PRE: 2.78 L
FRCPLETH-PRED: 2.54 L
FVC-%PRED-PRE: 99 %
FVC-PRE: 2.36 L
FVC-PRED: 2.37 L
GAW-PRED: 1.03 L/S/CMH2O
IC-%PRED-PRE: 107 %
IC-PRE: 2.03 L
IC-PRED: 1.88 L
Lab: 86 %
RVPLETH-%PRED-PRE: 131 %
RVPLETH-PRE: 2.51 L
RVPLETH-PRED: 1.9 L
SGAW-PRED: 0.2 1/CMH2O*S
SRAW-PRED: < 4.76 CMH2O*S
TLCPLETH-%PRED-PRE: 107 %
TLCPLETH-PRE: 4.81 L
TLCPLETH-PRED: 4.5 L
VA-%PRED-PRE: 96 %
VA-PRE: 3.97 L
VC-%PRED-PRE: 90 %
VC-PRE: 2.31 L
VC-PRED: 2.54 L

## 2025-09-03 PROCEDURE — 94375 RESPIRATORY FLOW VOLUME LOOP: CPT | Performed by: INTERNAL MEDICINE

## 2025-09-03 PROCEDURE — 94729 DIFFUSING CAPACITY: CPT | Performed by: INTERNAL MEDICINE

## 2025-09-03 PROCEDURE — 94150 VITAL CAPACITY TEST: CPT | Performed by: INTERNAL MEDICINE

## 2025-09-03 PROCEDURE — 94726 PLETHYSMOGRAPHY LUNG VOLUMES: CPT | Performed by: INTERNAL MEDICINE

## 2025-09-08 ENCOUNTER — PRE VISIT (OUTPATIENT)
Dept: PULMONOLOGY | Facility: CLINIC | Age: 75
End: 2025-09-08